# Patient Record
Sex: FEMALE | Race: WHITE | NOT HISPANIC OR LATINO | Employment: OTHER | URBAN - METROPOLITAN AREA
[De-identification: names, ages, dates, MRNs, and addresses within clinical notes are randomized per-mention and may not be internally consistent; named-entity substitution may affect disease eponyms.]

---

## 2018-02-21 ENCOUNTER — CONVERSION ENCOUNTER (OUTPATIENT)
Dept: FAMILY MEDICINE CLINIC | Facility: CLINIC | Age: 73
End: 2018-02-21

## 2018-02-21 LAB
ALBUMIN SERPL-MCNC: 4 G/DL (ref 3.6–5.1)
ALBUMIN/GLOB SERPL: ABNORMAL {RATIO} (ref 1–2.1)
ALP SERPL-CCNC: 69 UNITS/L (ref 33–130)
ALT SERPL-CCNC: 28 UNITS/L (ref 6–40)
AST SERPL-CCNC: 23 UNITS/L (ref 10–35)
BASOPHILS # BLD AUTO: ABNORMAL 10*3/MM3 (ref 0–200)
BASOPHILS NFR BLD AUTO: 1 %
BILIRUB SERPL-MCNC: 0.9 MG/DL (ref 0.2–1.2)
BUN SERPL-MCNC: 24 MG/DL (ref 7–25)
BUN/CREAT SERPL: ABNORMAL (ref 6–22)
CALCIUM SERPL-MCNC: 9.9 MG/DL (ref 8.6–10.2)
CHLORIDE SERPL-SCNC: 106 MMOL/L (ref 98–110)
CHOLEST SERPL-MCNC: 155 MG/DL (ref 125–200)
CHOLEST/HDLC SERPL: ABNORMAL {RATIO}
CONV CO2: 27 MMOL/L (ref 21–33)
CONV NEUTROPHILS/100 LEUKOCYTES IN BODY FLUID BY MANUAL COUNT: 66 %
CONV TOTAL PROTEIN: 6.3 G/DL (ref 6.2–8.3)
CREAT UR-MCNC: 0.8 MG/DL (ref 0.63–1.22)
EOSINOPHIL # BLD AUTO: 5 %
EOSINOPHIL # BLD AUTO: ABNORMAL 10*3/MM3 (ref 15–500)
ERYTHROCYTE [DISTWIDTH] IN BLOOD BY AUTOMATED COUNT: 13.2 % (ref 11–15)
GLOBULIN UR ELPH-MCNC: ABNORMAL G/DL (ref 2.2–3.9)
GLUCOSE SERPL-MCNC: 88 MG/DL (ref 65–99)
HCT VFR BLD AUTO: 40.9 % (ref 35–45)
HDLC SERPL-MCNC: 38 MG/DL
HGB BLD-MCNC: 13.5 G/DL (ref 11.7–15.5)
LDLC SERPL CALC-MCNC: 73 MG/DL
LYMPHOCYTES # BLD AUTO: ABNORMAL 10*3/MM3 (ref 850–3900)
LYMPHOCYTES NFR BLD AUTO: 24 %
MCH RBC QN AUTO: 29.3 PG (ref 27–33)
MCHC RBC AUTO-ENTMCNC: ABNORMAL % (ref 32–36)
MCV RBC AUTO: 88.6 FL (ref 80–100)
MONOCYTES # BLD AUTO: ABNORMAL 10*3/MICROLITER (ref 200–950)
MONOCYTES NFR BLD AUTO: 5 %
NEUTROPHILS # BLD AUTO: ABNORMAL 10*3/MM3 (ref 1500–7800)
PLATELET # BLD AUTO: ABNORMAL 10*3/MM3 (ref 140–400)
PMV BLD AUTO: 9.1 FL (ref 7.5–11.5)
POTASSIUM SERPL-SCNC: 4.5 MMOL/L (ref 3.5–5.3)
RBC # BLD AUTO: ABNORMAL 10*6/MM3 (ref 3.8–5.1)
SODIUM SERPL-SCNC: 142 MMOL/L (ref 135–146)
TRIGL SERPL-MCNC: 219 MG/DL
WBC # BLD AUTO: ABNORMAL 10*3/MM3 (ref 3.8–10.8)

## 2018-08-28 ENCOUNTER — HOSPITAL ENCOUNTER (OUTPATIENT)
Dept: FAMILY MEDICINE CLINIC | Facility: CLINIC | Age: 73
Discharge: HOME | End: 2018-08-28
Attending: NURSE PRACTITIONER | Admitting: NURSE PRACTITIONER

## 2018-08-28 LAB
ALBUMIN SERPL-MCNC: 4.4 G/DL (ref 3.6–5.1)
ALBUMIN/GLOB SERPL: NORMAL {RATIO} (ref 1–2.5)
ALP SERPL-CCNC: 89 UNITS/L (ref 33–130)
ALT SERPL-CCNC: 15 UNITS/L (ref 6–29)
AST SERPL-CCNC: 13 UNITS/L (ref 10–35)
BASOPHILS # BLD AUTO: NORMAL 10*3/MM3 (ref 0–200)
BASOPHILS NFR BLD AUTO: 0.9 %
BILIRUB SERPL-MCNC: 0.4 MG/DL (ref 0.2–1.2)
BUN SERPL-MCNC: 20 MG/DL (ref 7–25)
BUN/CREAT SERPL: NORMAL (ref 6–22)
CALCIUM SERPL-MCNC: 10 MG/DL (ref 8.6–10.4)
CHLORIDE SERPL-SCNC: 104 MMOL/L (ref 98–110)
CO2 CONTENT VENOUS: 28 MMOL/L (ref 20–32)
CONV NEUTROPHILS/100 LEUKOCYTES IN BODY FLUID BY MANUAL COUNT: 66.6 %
CONV TOTAL PROTEIN: 6.8 G/DL (ref 6.1–8.1)
CREAT UR-MCNC: 0.71 MG/DL (ref 0.6–0.93)
EOSINOPHIL # BLD AUTO: 3.2 %
EOSINOPHIL # BLD AUTO: NORMAL 10*3/MM3 (ref 15–500)
ERYTHROCYTE [DISTWIDTH] IN BLOOD BY AUTOMATED COUNT: 13 % (ref 11–15)
GLOBULIN UR ELPH-MCNC: NORMAL G/DL (ref 1.9–3.7)
GLUCOSE SERPL-MCNC: 79 MG/DL (ref 65–139)
HCT VFR BLD AUTO: 40 % (ref 35–45)
HGB BLD-MCNC: 13.5 G/DL (ref 11.7–15.5)
LYMPHOCYTES # BLD AUTO: NORMAL 10*3/MM3 (ref 850–3900)
LYMPHOCYTES NFR BLD AUTO: 22.7 %
MCH RBC QN AUTO: 29.7 PG (ref 27–33)
MCHC RBC AUTO-ENTMCNC: NORMAL % (ref 32–36)
MCV RBC AUTO: 88.1 FL (ref 80–100)
MONOCYTES # BLD AUTO: NORMAL 10*3/MICROLITER (ref 200–950)
MONOCYTES NFR BLD AUTO: 6.6 %
NEUTROPHILS # BLD AUTO: NORMAL 10*3/MM3 (ref 1500–7800)
PLATELET # BLD AUTO: NORMAL 10*3/MM3 (ref 140–400)
PMV BLD AUTO: 10.9 FL (ref 7.5–12.5)
POTASSIUM SERPL-SCNC: 4 MMOL/L (ref 3.5–5.3)
RBC # BLD AUTO: NORMAL 10*6/MM3 (ref 3.8–5.1)
SODIUM SERPL-SCNC: 140 MMOL/L (ref 135–146)
WBC # BLD AUTO: NORMAL K/UL (ref 3.8–10.8)

## 2019-10-10 ENCOUNTER — OFFICE VISIT (OUTPATIENT)
Dept: FAMILY MEDICINE CLINIC | Facility: CLINIC | Age: 74
End: 2019-10-10

## 2019-10-10 VITALS
WEIGHT: 166 LBS | SYSTOLIC BLOOD PRESSURE: 168 MMHG | BODY MASS INDEX: 27.66 KG/M2 | DIASTOLIC BLOOD PRESSURE: 78 MMHG | TEMPERATURE: 97.8 F | HEART RATE: 54 BPM | OXYGEN SATURATION: 99 % | HEIGHT: 65 IN

## 2019-10-10 DIAGNOSIS — I10 ESSENTIAL HYPERTENSION: ICD-10-CM

## 2019-10-10 DIAGNOSIS — Z78.0 POSTMENOPAUSAL: ICD-10-CM

## 2019-10-10 DIAGNOSIS — Z12.31 ENCOUNTER FOR SCREENING MAMMOGRAM FOR BREAST CANCER: ICD-10-CM

## 2019-10-10 DIAGNOSIS — M19.90 ARTHRITIS: ICD-10-CM

## 2019-10-10 DIAGNOSIS — Z00.00 PREVENTATIVE HEALTH CARE: ICD-10-CM

## 2019-10-10 DIAGNOSIS — Z23 NEED FOR VACCINATION WITH 13-POLYVALENT PNEUMOCOCCAL CONJUGATE VACCINE: ICD-10-CM

## 2019-10-10 DIAGNOSIS — Z23 FLU VACCINE NEED: Primary | ICD-10-CM

## 2019-10-10 DIAGNOSIS — G89.29 OTHER CHRONIC PAIN: ICD-10-CM

## 2019-10-10 PROCEDURE — 99214 OFFICE O/P EST MOD 30 MIN: CPT | Performed by: NURSE PRACTITIONER

## 2019-10-10 PROCEDURE — G0009 ADMIN PNEUMOCOCCAL VACCINE: HCPCS | Performed by: NURSE PRACTITIONER

## 2019-10-10 PROCEDURE — G0008 ADMIN INFLUENZA VIRUS VAC: HCPCS | Performed by: NURSE PRACTITIONER

## 2019-10-10 PROCEDURE — 90670 PCV13 VACCINE IM: CPT | Performed by: NURSE PRACTITIONER

## 2019-10-10 PROCEDURE — 90653 IIV ADJUVANT VACCINE IM: CPT | Performed by: NURSE PRACTITIONER

## 2019-10-10 RX ORDER — MELOXICAM 15 MG/1
15 TABLET ORAL DAILY
Qty: 90 TABLET | Refills: 1 | Status: SHIPPED | OUTPATIENT
Start: 2019-10-10 | End: 2020-07-18

## 2019-10-10 RX ORDER — LISINOPRIL 10 MG/1
10 TABLET ORAL DAILY
Qty: 30 TABLET | Refills: 2 | Status: SHIPPED | OUTPATIENT
Start: 2019-10-10 | End: 2019-10-10 | Stop reason: SDUPTHER

## 2019-10-10 RX ORDER — DULOXETIN HYDROCHLORIDE 60 MG/1
60 CAPSULE, DELAYED RELEASE ORAL DAILY
COMMUNITY
End: 2019-10-10 | Stop reason: SDUPTHER

## 2019-10-10 RX ORDER — BISOPROLOL FUMARATE AND HYDROCHLOROTHIAZIDE 10; 6.25 MG/1; MG/1
1 TABLET ORAL DAILY
Qty: 30 TABLET | Refills: 2 | Status: SHIPPED | OUTPATIENT
Start: 2019-10-10 | End: 2019-10-10 | Stop reason: SDUPTHER

## 2019-10-10 RX ORDER — LISINOPRIL 10 MG/1
10 TABLET ORAL DAILY
Qty: 90 TABLET | Refills: 1 | Status: SHIPPED | OUTPATIENT
Start: 2019-10-10 | End: 2020-07-18

## 2019-10-10 RX ORDER — MELOXICAM 15 MG/1
15 TABLET ORAL DAILY
COMMUNITY
End: 2019-10-10 | Stop reason: SDUPTHER

## 2019-10-10 RX ORDER — MELOXICAM 15 MG/1
15 TABLET ORAL DAILY
Qty: 30 TABLET | Refills: 2 | Status: SHIPPED | OUTPATIENT
Start: 2019-10-10 | End: 2019-10-10 | Stop reason: SDUPTHER

## 2019-10-10 RX ORDER — DULOXETIN HYDROCHLORIDE 60 MG/1
60 CAPSULE, DELAYED RELEASE ORAL DAILY
Qty: 30 CAPSULE | Refills: 2 | Status: SHIPPED | OUTPATIENT
Start: 2019-10-10 | End: 2019-10-10 | Stop reason: SDUPTHER

## 2019-10-10 RX ORDER — ATORVASTATIN CALCIUM 40 MG/1
40 TABLET, FILM COATED ORAL
COMMUNITY
End: 2019-10-10 | Stop reason: SDUPTHER

## 2019-10-10 RX ORDER — BISOPROLOL FUMARATE AND HYDROCHLOROTHIAZIDE 10; 6.25 MG/1; MG/1
1 TABLET ORAL DAILY
Qty: 90 TABLET | Refills: 1 | Status: SHIPPED | OUTPATIENT
Start: 2019-10-10 | End: 2020-07-18

## 2019-10-10 RX ORDER — BISOPROLOL FUMARATE AND HYDROCHLOROTHIAZIDE 10; 6.25 MG/1; MG/1
1 TABLET ORAL DAILY
COMMUNITY
End: 2019-10-10 | Stop reason: SDUPTHER

## 2019-10-10 RX ORDER — DULOXETIN HYDROCHLORIDE 60 MG/1
60 CAPSULE, DELAYED RELEASE ORAL DAILY
Qty: 90 CAPSULE | Refills: 1 | Status: SHIPPED | OUTPATIENT
Start: 2019-10-10 | End: 2020-06-15

## 2019-10-10 RX ORDER — LISINOPRIL 10 MG/1
10 TABLET ORAL DAILY
COMMUNITY
End: 2019-10-10 | Stop reason: SDUPTHER

## 2019-10-10 RX ORDER — ATORVASTATIN CALCIUM 40 MG/1
40 TABLET, FILM COATED ORAL
Qty: 90 TABLET | Refills: 1 | Status: SHIPPED | OUTPATIENT
Start: 2019-10-10 | End: 2020-04-07

## 2019-10-10 NOTE — PATIENT INSTRUCTIONS
Fasting blood work today   Keep appointment for mammogram and DEXA scan   monitor blood pressure at home   consider CBD tablets for arthritis

## 2019-10-10 NOTE — PROGRESS NOTES
Subjective   Shelley Black is a 74 y.o. female.     74-year-old white female with history of hypertension, chronic arthritis pain, psoriasis and allergic rhinitis comes in today for annual visit and fasting blood work.  She denies any problems with exception of arthritis in her hands and ventricles are enlarged due to arthritis panel today since she has never had one  Blood pressure elevated today 160/78 heart rate 54 but at home she checks it regularly and it is in the 120s 130s over 70s and 80s.  She denies any chest pain, dyspnea, tachycardia cardia, dizziness or edema  Weight is unchanged at 166  She is up-to-date on eye exams colonoscopies and I am getting her mammogram and DEXA scan.  She had a flu shot Prevnar 13 today     mammogram/ DEXA scan   fasting blood work today   flu shot/ Prevnar 13         The following portions of the patient's history were reviewed and updated as appropriate: allergies, current medications, past family history, past medical history, past social history, past surgical history and problem list.    Review of Systems   Constitutional: Negative.    Respiratory: Negative.    Cardiovascular: Negative.    Genitourinary: Negative.    Musculoskeletal: Negative.         Arthritis and hands   Skin: Negative.    Neurological: Negative.    Psychiatric/Behavioral: Negative.        Objective   Physical Exam   Constitutional: She is oriented to person, place, and time. She appears well-developed and well-nourished.   Cardiovascular: Normal rate and regular rhythm.   Pulmonary/Chest: Effort normal and breath sounds normal.   Abdominal: Soft. Bowel sounds are normal.   Musculoskeletal:   Chronic arthritis pain in hands with enlarged knuckles   Neurological: She is alert and oriented to person, place, and time.   Skin: Skin is warm and dry.   Psychiatric: She has a normal mood and affect.         Assessment/Plan   Shelley was seen today for hypertension and depression.    Diagnoses and all orders  for this visit:    Flu vaccine need  -     Fluad Quad >65 years    Essential hypertension    Other chronic pain    Arthritis    Preventative health care  -     CBC & Differential  -     Comprehensive Metabolic Panel  -     Lipid Panel With LDL / HDL Ratio  -     PEPE  -     C-reactive Protein  -     Sedimentation Rate  -     Rheumatoid Arthritis Expanded Panel    Other orders  -     bisoprolol-hydrochlorothiazide (ZIAC) 10-6.25 MG per tablet; Take 1 tablet by mouth Daily.  -     DULoxetine (CYMBALTA) 60 MG capsule; Take 1 capsule by mouth Daily.  -     lisinopril (PRINIVIL,ZESTRIL) 10 MG tablet; Take 1 tablet by mouth Daily.  -     meloxicam (MOBIC) 15 MG tablet; Take 1 tablet by mouth Daily.

## 2019-10-11 LAB
ALBUMIN SERPL-MCNC: 4.7 G/DL (ref 3.5–4.8)
ALBUMIN/GLOB SERPL: 2.1 {RATIO} (ref 1.2–2.2)
ALP SERPL-CCNC: 96 IU/L (ref 39–117)
ALT SERPL-CCNC: 15 IU/L (ref 0–32)
AST SERPL-CCNC: 15 IU/L (ref 0–40)
BILIRUB SERPL-MCNC: 0.5 MG/DL (ref 0–1.2)
BUN SERPL-MCNC: 26 MG/DL (ref 8–27)
BUN/CREAT SERPL: 35 (ref 12–28)
CALCIUM SERPL-MCNC: 9.8 MG/DL (ref 8.7–10.3)
CHLORIDE SERPL-SCNC: 104 MMOL/L (ref 96–106)
CHOLEST SERPL-MCNC: 213 MG/DL (ref 100–199)
CO2 SERPL-SCNC: 25 MMOL/L (ref 20–29)
CREAT SERPL-MCNC: 0.75 MG/DL (ref 0.57–1)
GLOBULIN SER CALC-MCNC: 2.2 G/DL (ref 1.5–4.5)
GLUCOSE SERPL-MCNC: 95 MG/DL (ref 65–99)
HDLC SERPL-MCNC: 38 MG/DL
LDLC SERPL CALC-MCNC: ABNORMAL MG/DL (ref 0–99)
LDLC/HDLC SERPL: ABNORMAL RATIO
POTASSIUM SERPL-SCNC: 4.7 MMOL/L (ref 3.5–5.2)
PROT SERPL-MCNC: 6.9 G/DL (ref 6–8.5)
SODIUM SERPL-SCNC: 145 MMOL/L (ref 134–144)
TRIGL SERPL-MCNC: 458 MG/DL (ref 0–149)
VLDLC SERPL CALC-MCNC: ABNORMAL MG/DL (ref 5–40)

## 2019-10-12 RX ORDER — FENOFIBRATE 145 MG/1
145 TABLET, COATED ORAL DAILY
Qty: 30 TABLET | Refills: 2 | Status: SHIPPED | OUTPATIENT
Start: 2019-10-12 | End: 2019-10-14 | Stop reason: SDUPTHER

## 2019-10-14 DIAGNOSIS — M19.90 ARTHRITIS: Primary | ICD-10-CM

## 2019-10-14 DIAGNOSIS — E78.1 HYPERTRIGLYCERIDEMIA: ICD-10-CM

## 2019-10-14 DIAGNOSIS — M25.50 PAIN IN JOINT, MULTIPLE SITES: ICD-10-CM

## 2019-10-14 DIAGNOSIS — I10 ESSENTIAL HYPERTENSION: ICD-10-CM

## 2019-10-14 RX ORDER — FENOFIBRATE 145 MG/1
145 TABLET, COATED ORAL DAILY
Qty: 90 TABLET | Refills: 1 | Status: SHIPPED | OUTPATIENT
Start: 2019-10-14 | End: 2020-06-15

## 2019-11-14 PROBLEM — E78.2 MIXED HYPERLIPIDEMIA: Status: ACTIVE | Noted: 2019-11-14

## 2019-11-14 PROBLEM — L40.9 PSORIASIS: Status: ACTIVE | Noted: 2017-06-08

## 2019-11-14 PROBLEM — F32.A DEPRESSION: Status: ACTIVE | Noted: 2017-06-08

## 2019-11-14 PROBLEM — M19.90 ARTHRITIS: Status: ACTIVE | Noted: 2017-06-08

## 2019-11-14 PROBLEM — IMO0002 BODY MASS INDEX (BMI) OF 25.0 TO 29.9: Status: ACTIVE | Noted: 2017-06-08

## 2019-11-14 PROBLEM — E78.1 HIGH TRIGLYCERIDES: Status: ACTIVE | Noted: 2019-11-14

## 2019-11-14 PROBLEM — Z01.818 PREOPERATIVE EVALUATION TO RULE OUT SURGICAL CONTRAINDICATION: Status: ACTIVE | Noted: 2018-08-28

## 2019-11-14 PROBLEM — M21.612 BUNION, LEFT: Status: ACTIVE | Noted: 2018-08-28

## 2019-11-14 PROBLEM — J30.1 ALLERGIC RHINITIS DUE TO POLLEN: Status: ACTIVE | Noted: 2017-06-08

## 2019-11-14 PROBLEM — Z13.220 ENCOUNTER FOR SCREENING FOR LIPOID DISORDERS: Status: ACTIVE | Noted: 2018-02-21

## 2020-01-14 ENCOUNTER — RESULTS ENCOUNTER (OUTPATIENT)
Dept: FAMILY MEDICINE CLINIC | Facility: CLINIC | Age: 75
End: 2020-01-14

## 2020-01-14 DIAGNOSIS — E78.1 HYPERTRIGLYCERIDEMIA: ICD-10-CM

## 2020-01-14 DIAGNOSIS — I10 ESSENTIAL HYPERTENSION: ICD-10-CM

## 2020-01-14 DIAGNOSIS — M25.50 PAIN IN JOINT, MULTIPLE SITES: ICD-10-CM

## 2020-01-27 ENCOUNTER — PATIENT OUTREACH (OUTPATIENT)
Dept: CASE MANAGEMENT | Facility: OTHER | Age: 75
End: 2020-01-27

## 2020-01-27 NOTE — OUTREACH NOTE
LM informing pt that she was due for her AWV and offered to flip her upcoming appointment to an AWV or schedule a separate appointment. Pt was advised to call back.     Cipriano De La Garza, [facility]  [facility] and    Phone: [phone]

## 2020-04-07 RX ORDER — ATORVASTATIN CALCIUM 40 MG/1
TABLET, FILM COATED ORAL
Qty: 90 TABLET | Refills: 3 | Status: SHIPPED | OUTPATIENT
Start: 2020-04-07 | End: 2021-04-07 | Stop reason: SDUPTHER

## 2020-04-09 ENCOUNTER — OFFICE VISIT (OUTPATIENT)
Dept: FAMILY MEDICINE CLINIC | Facility: CLINIC | Age: 75
End: 2020-04-09

## 2020-04-09 VITALS — HEART RATE: 57 BPM | DIASTOLIC BLOOD PRESSURE: 80 MMHG | SYSTOLIC BLOOD PRESSURE: 132 MMHG

## 2020-04-09 DIAGNOSIS — E78.2 MIXED HYPERLIPIDEMIA: Primary | ICD-10-CM

## 2020-04-09 DIAGNOSIS — Z00.00 PREVENTATIVE HEALTH CARE: ICD-10-CM

## 2020-04-09 DIAGNOSIS — Z78.0 POSTMENOPAUSAL: ICD-10-CM

## 2020-04-09 DIAGNOSIS — Z12.31 OTHER SCREENING MAMMOGRAM: ICD-10-CM

## 2020-04-09 PROCEDURE — 99441 PR PHYS/QHP TELEPHONE EVALUATION 5-10 MIN: CPT | Performed by: NURSE PRACTITIONER

## 2020-04-09 NOTE — PROGRESS NOTES
Shelley Black is a 75 y.o. female.     You have chosen to receive care through a telephone visit today. Do you consent to use a telephone visit for your medical care today? Yes    74-year-old white female with history of hypertension, chronic arthritis pain, psoriasis and allergic rhinitis who calls in today for follow-up telephone visit.  Patient states she is doing well has no new complaints.  Blood pressure at home this morning 132/80 heart rate 76 she denies any chest pain, dyspnea, tachycardia or dizziness  She was unable to do her mammogram DEXA scan due to the corona quarantine we will try to schedule for July.  She is up-to-date on her eye exams and colonoscopies  Her last triglycerides were 458 and she is going to come in for fasting blood work to recheck that    Phone time 10 minutes    Fasting blood work  Mammogram/DEXA scan in July       The following portions of the patient's history were reviewed and updated as appropriate: allergies, current medications, past family history, past medical history, past social history, past surgical history and problem list.    Vitals:    20 0908   BP: 132/80   Pulse: 57     There is no height or weight on file to calculate BMI.    Past Medical History:   Diagnosis Date   • Depression    • Hyperlipidemia    • Hypertension      Past Surgical History:   Procedure Laterality Date   •  SECTION       Family History   Problem Relation Age of Onset   • Cancer Mother    • Cancer Father    • Heart disease Father      Immunization History   Administered Date(s) Administered   • Fluad Quad 10/10/2019   • Pneumococcal Conjugate 13-Valent (PCV13) 10/10/2019       Office Visit on 10/10/2019   Component Date Value Ref Range Status   • Glucose 10/10/2019 95  65 - 99 mg/dL Final   • BUN 10/10/2019 26  8 - 27 mg/dL Final   • Creatinine 10/10/2019 0.75  0.57 - 1.00 mg/dL Final   • eGFR Non African Am 10/10/2019 80  >59 mL/min/1.73 Final   • eGFR African Am 10/10/2019  92  >59 mL/min/1.73 Final   • BUN/Creatinine Ratio 10/10/2019 35* 12 - 28 Final   • Sodium 10/10/2019 145* 134 - 144 mmol/L Final   • Potassium 10/10/2019 4.7  3.5 - 5.2 mmol/L Final   • Chloride 10/10/2019 104  96 - 106 mmol/L Final   • Total CO2 10/10/2019 25  20 - 29 mmol/L Final   • Calcium 10/10/2019 9.8  8.7 - 10.3 mg/dL Final   • Total Protein 10/10/2019 6.9  6.0 - 8.5 g/dL Final   • Albumin 10/10/2019 4.7  3.5 - 4.8 g/dL Final   • Globulin 10/10/2019 2.2  1.5 - 4.5 g/dL Final   • A/G Ratio 10/10/2019 2.1  1.2 - 2.2 Final   • Total Bilirubin 10/10/2019 0.5  0.0 - 1.2 mg/dL Final   • Alkaline Phosphatase 10/10/2019 96  39 - 117 IU/L Final   • AST (SGOT) 10/10/2019 15  0 - 40 IU/L Final   • ALT (SGPT) 10/10/2019 15  0 - 32 IU/L Final   • Total Cholesterol 10/10/2019 213* 100 - 199 mg/dL Final   • Triglycerides 10/10/2019 458* 0 - 149 mg/dL Final   • HDL Cholesterol 10/10/2019 38* >39 mg/dL Final   • VLDL Cholesterol 10/10/2019 Comment  5 - 40 mg/dL Final    Comment: The calculation for the VLDL cholesterol is not valid when  triglyceride level is >400 mg/dL.     • LDL Cholesterol  10/10/2019 Comment  0 - 99 mg/dL Final    Comment: Triglyceride result indicated is too high for an accurate LDL  cholesterol estimation.     • LDL/HDL Ratio 10/10/2019 CANCELED  ratio Final-Edited    Comment: Unable to calculate result since non-numeric result obtained for  component test.                                      LDL/HDL Ratio                                              Men  Women                                1/2 Avg.Risk  1.0    1.5                                    Avg.Risk  3.6    3.2                                 2X Avg.Risk  6.2    5.0                                 3X Avg.Risk  8.0    6.1    Result canceled by the ancillary.           Review of Systems   Constitutional: Negative.    HENT: Negative.    Respiratory: Negative.    Gastrointestinal: Negative.    Genitourinary: Negative.    Musculoskeletal:  Negative.    Skin: Negative.    Neurological: Negative.    Psychiatric/Behavioral: Negative.        Objective   Physical Exam   Constitutional: She appears well-developed and well-nourished.   Cardiovascular: Normal rate and regular rhythm.   Per patient   Pulmonary/Chest: Effort normal and breath sounds normal.   Per patient   Abdominal: Bowel sounds are normal.   Musculoskeletal: Normal range of motion.   Per patient   Skin: Skin is warm and dry.   Psychiatric: She has a normal mood and affect.       Procedures    Assessment/Plan   Shelley was seen today for hypertension and hyperlipidemia.    Diagnoses and all orders for this visit:    Mixed hyperlipidemia  -     Lipid Panel With LDL / HDL Ratio    Preventative health care  -     CBC & Differential  -     Comprehensive Metabolic Panel          Current Outpatient Medications:   •  atorvastatin (LIPITOR) 40 MG tablet, TAKE 1 TABLET EVERY NIGHT AT BEDTIME, Disp: 90 tablet, Rfl: 3  •  bisoprolol-hydrochlorothiazide (ZIAC) 10-6.25 MG per tablet, Take 1 tablet by mouth Daily., Disp: 90 tablet, Rfl: 1  •  DULoxetine (CYMBALTA) 60 MG capsule, Take 1 capsule by mouth Daily., Disp: 90 capsule, Rfl: 1  •  fenofibrate (TRICOR) 145 MG tablet, Take 1 tablet by mouth Daily., Disp: 90 tablet, Rfl: 1  •  lisinopril (PRINIVIL,ZESTRIL) 10 MG tablet, Take 1 tablet by mouth Daily., Disp: 90 tablet, Rfl: 1  •  meloxicam (MOBIC) 15 MG tablet, Take 1 tablet by mouth Daily., Disp: 90 tablet, Rfl: 1

## 2020-06-11 LAB
ALBUMIN SERPL-MCNC: 4.9 G/DL (ref 3.7–4.7)
ALBUMIN/GLOB SERPL: 2.2 {RATIO} (ref 1.2–2.2)
ALP SERPL-CCNC: 75 IU/L (ref 39–117)
ALT SERPL-CCNC: 18 IU/L (ref 0–32)
AST SERPL-CCNC: 16 IU/L (ref 0–40)
BASOPHILS # BLD AUTO: 0.1 X10E3/UL (ref 0–0.2)
BASOPHILS NFR BLD AUTO: 1 %
BILIRUB SERPL-MCNC: 0.6 MG/DL (ref 0–1.2)
BUN SERPL-MCNC: 26 MG/DL (ref 8–27)
BUN/CREAT SERPL: 35 (ref 12–28)
CALCIUM SERPL-MCNC: 10.2 MG/DL (ref 8.7–10.3)
CHLORIDE SERPL-SCNC: 105 MMOL/L (ref 96–106)
CHOLEST SERPL-MCNC: 159 MG/DL (ref 100–199)
CO2 SERPL-SCNC: 25 MMOL/L (ref 20–29)
CREAT SERPL-MCNC: 0.74 MG/DL (ref 0.57–1)
EOSINOPHIL # BLD AUTO: 0.4 X10E3/UL (ref 0–0.4)
EOSINOPHIL NFR BLD AUTO: 5 %
ERYTHROCYTE [DISTWIDTH] IN BLOOD BY AUTOMATED COUNT: 13 % (ref 11.7–15.4)
GLOBULIN SER CALC-MCNC: 2.2 G/DL (ref 1.5–4.5)
GLUCOSE SERPL-MCNC: 93 MG/DL (ref 65–99)
HCT VFR BLD AUTO: 46.5 % (ref 34–46.6)
HDLC SERPL-MCNC: 38 MG/DL
HGB BLD-MCNC: 14.8 G/DL (ref 11.1–15.9)
IMM GRANULOCYTES # BLD AUTO: 0 X10E3/UL (ref 0–0.1)
IMM GRANULOCYTES NFR BLD AUTO: 0 %
LDLC SERPL CALC-MCNC: 72 MG/DL (ref 0–99)
LDLC/HDLC SERPL: 1.9 RATIO (ref 0–3.2)
LYMPHOCYTES # BLD AUTO: 1.8 X10E3/UL (ref 0.7–3.1)
LYMPHOCYTES NFR BLD AUTO: 24 %
MCH RBC QN AUTO: 29 PG (ref 26.6–33)
MCHC RBC AUTO-ENTMCNC: 31.8 G/DL (ref 31.5–35.7)
MCV RBC AUTO: 91 FL (ref 79–97)
MONOCYTES # BLD AUTO: 0.5 X10E3/UL (ref 0.1–0.9)
MONOCYTES NFR BLD AUTO: 7 %
NEUTROPHILS # BLD AUTO: 4.9 X10E3/UL (ref 1.4–7)
NEUTROPHILS NFR BLD AUTO: 63 %
PLATELET # BLD AUTO: 263 X10E3/UL (ref 150–450)
POTASSIUM SERPL-SCNC: 4.3 MMOL/L (ref 3.5–5.2)
PROT SERPL-MCNC: 7.1 G/DL (ref 6–8.5)
RBC # BLD AUTO: 5.1 X10E6/UL (ref 3.77–5.28)
SODIUM SERPL-SCNC: 146 MMOL/L (ref 134–144)
TRIGL SERPL-MCNC: 244 MG/DL (ref 0–149)
VLDLC SERPL CALC-MCNC: 49 MG/DL (ref 5–40)
WBC # BLD AUTO: 7.7 X10E3/UL (ref 3.4–10.8)

## 2020-06-15 RX ORDER — DULOXETIN HYDROCHLORIDE 60 MG/1
CAPSULE, DELAYED RELEASE ORAL
Qty: 90 CAPSULE | Refills: 3 | Status: SHIPPED | OUTPATIENT
Start: 2020-06-15 | End: 2021-04-07 | Stop reason: SDUPTHER

## 2020-06-15 RX ORDER — FENOFIBRATE 145 MG/1
TABLET, COATED ORAL
Qty: 90 TABLET | Refills: 3 | Status: SHIPPED | OUTPATIENT
Start: 2020-06-15 | End: 2021-04-07 | Stop reason: SDUPTHER

## 2020-07-18 RX ORDER — MELOXICAM 15 MG/1
TABLET ORAL
Qty: 30 TABLET | Refills: 11 | Status: SHIPPED | OUTPATIENT
Start: 2020-07-18 | End: 2021-04-07 | Stop reason: SDUPTHER

## 2020-07-18 RX ORDER — LISINOPRIL 10 MG/1
TABLET ORAL
Qty: 30 TABLET | Refills: 11 | Status: SHIPPED | OUTPATIENT
Start: 2020-07-18 | End: 2021-04-07 | Stop reason: SDUPTHER

## 2020-07-18 RX ORDER — BISOPROLOL FUMARATE AND HYDROCHLOROTHIAZIDE 10; 6.25 MG/1; MG/1
TABLET ORAL
Qty: 30 TABLET | Refills: 11 | Status: SHIPPED | OUTPATIENT
Start: 2020-07-18 | End: 2021-04-07 | Stop reason: SDUPTHER

## 2020-08-31 DIAGNOSIS — R92.8 ABNORMAL MAMMOGRAM: Primary | ICD-10-CM

## 2020-08-31 DIAGNOSIS — Z78.0 POSTMENOPAUSAL: ICD-10-CM

## 2020-08-31 DIAGNOSIS — Z12.31 OTHER SCREENING MAMMOGRAM: ICD-10-CM

## 2020-09-21 ENCOUNTER — TELEPHONE (OUTPATIENT)
Dept: FAMILY MEDICINE CLINIC | Facility: CLINIC | Age: 75
End: 2020-09-21

## 2020-09-21 DIAGNOSIS — R92.8 ABNORMAL MAMMOGRAM: Primary | ICD-10-CM

## 2020-10-01 PROCEDURE — U0003 INFECTIOUS AGENT DETECTION BY NUCLEIC ACID (DNA OR RNA); SEVERE ACUTE RESPIRATORY SYNDROME CORONAVIRUS 2 (SARS-COV-2) (CORONAVIRUS DISEASE [COVID-19]), AMPLIFIED PROBE TECHNIQUE, MAKING USE OF HIGH THROUGHPUT TECHNOLOGIES AS DESCRIBED BY CMS-2020-01-R: HCPCS

## 2020-10-02 ENCOUNTER — TELEPHONE (OUTPATIENT)
Dept: URGENT CARE | Facility: CLINIC | Age: 75
End: 2020-10-02

## 2020-10-02 NOTE — TELEPHONE ENCOUNTER
I called this patient her coronavirus test.  Told her that it was positive.  Told her she would need to continue home quarantine for 10 days.  Told her she would need to stay 6 feet away from others in her home and to wear a mask if she is in the same room with others even if she is 10 feet away.

## 2020-10-27 DIAGNOSIS — R92.8 ABNORMAL MAMMOGRAM: ICD-10-CM

## 2021-04-07 ENCOUNTER — OFFICE VISIT (OUTPATIENT)
Dept: FAMILY MEDICINE CLINIC | Facility: CLINIC | Age: 76
End: 2021-04-07

## 2021-04-07 VITALS
HEIGHT: 65 IN | DIASTOLIC BLOOD PRESSURE: 84 MMHG | HEART RATE: 60 BPM | SYSTOLIC BLOOD PRESSURE: 146 MMHG | TEMPERATURE: 97.3 F | BODY MASS INDEX: 28.39 KG/M2 | WEIGHT: 170.4 LBS | OXYGEN SATURATION: 96 %

## 2021-04-07 DIAGNOSIS — Z00.00 PREVENTATIVE HEALTH CARE: ICD-10-CM

## 2021-04-07 DIAGNOSIS — Z12.11 COLON CANCER SCREENING: Primary | ICD-10-CM

## 2021-04-07 DIAGNOSIS — E78.2 MIXED HYPERLIPIDEMIA: ICD-10-CM

## 2021-04-07 PROBLEM — IMO0002 BODY MASS INDEX (BMI) OF 25.0 TO 29.9: Status: RESOLVED | Noted: 2017-06-08 | Resolved: 2021-04-07

## 2021-04-07 PROCEDURE — 99213 OFFICE O/P EST LOW 20 MIN: CPT | Performed by: NURSE PRACTITIONER

## 2021-04-07 RX ORDER — DULOXETIN HYDROCHLORIDE 60 MG/1
60 CAPSULE, DELAYED RELEASE ORAL DAILY
Qty: 90 CAPSULE | Refills: 1 | Status: SHIPPED | OUTPATIENT
Start: 2021-04-07 | End: 2021-10-07 | Stop reason: SDUPTHER

## 2021-04-07 RX ORDER — ATORVASTATIN CALCIUM 40 MG/1
40 TABLET, FILM COATED ORAL
Qty: 90 TABLET | Refills: 1 | Status: SHIPPED | OUTPATIENT
Start: 2021-04-07 | End: 2021-09-29

## 2021-04-07 RX ORDER — MELOXICAM 15 MG/1
15 TABLET ORAL DAILY
Qty: 90 TABLET | Refills: 1 | Status: SHIPPED | OUTPATIENT
Start: 2021-04-07 | End: 2021-10-07 | Stop reason: SDUPTHER

## 2021-04-07 RX ORDER — LISINOPRIL 10 MG/1
10 TABLET ORAL DAILY
Qty: 90 TABLET | Refills: 1 | Status: SHIPPED | OUTPATIENT
Start: 2021-04-07 | End: 2021-10-07 | Stop reason: SDUPTHER

## 2021-04-07 RX ORDER — BISOPROLOL FUMARATE AND HYDROCHLOROTHIAZIDE 10; 6.25 MG/1; MG/1
1 TABLET ORAL DAILY
Qty: 90 TABLET | Refills: 1 | Status: SHIPPED | OUTPATIENT
Start: 2021-04-07 | End: 2021-10-07 | Stop reason: SDUPTHER

## 2021-04-07 RX ORDER — FENOFIBRATE 145 MG/1
145 TABLET, COATED ORAL DAILY
Qty: 90 TABLET | Refills: 1 | Status: SHIPPED | OUTPATIENT
Start: 2021-04-07 | End: 2021-10-07 | Stop reason: SDUPTHER

## 2021-04-08 LAB
ALBUMIN SERPL-MCNC: 4.7 G/DL (ref 3.7–4.7)
ALBUMIN/GLOB SERPL: 2 {RATIO} (ref 1.2–2.2)
ALP SERPL-CCNC: 73 IU/L (ref 39–117)
ALT SERPL-CCNC: 16 IU/L (ref 0–32)
AST SERPL-CCNC: 17 IU/L (ref 0–40)
BASOPHILS # BLD AUTO: 0.1 X10E3/UL (ref 0–0.2)
BASOPHILS NFR BLD AUTO: 1 %
BILIRUB SERPL-MCNC: 0.5 MG/DL (ref 0–1.2)
BUN SERPL-MCNC: 27 MG/DL (ref 8–27)
BUN/CREAT SERPL: 32 (ref 12–28)
CALCIUM SERPL-MCNC: 10 MG/DL (ref 8.7–10.3)
CHLORIDE SERPL-SCNC: 107 MMOL/L (ref 96–106)
CHOLEST SERPL-MCNC: 174 MG/DL (ref 100–199)
CO2 SERPL-SCNC: 21 MMOL/L (ref 20–29)
CREAT SERPL-MCNC: 0.84 MG/DL (ref 0.57–1)
EOSINOPHIL # BLD AUTO: 0.3 X10E3/UL (ref 0–0.4)
EOSINOPHIL NFR BLD AUTO: 4 %
ERYTHROCYTE [DISTWIDTH] IN BLOOD BY AUTOMATED COUNT: 13.9 % (ref 11.7–15.4)
GLOBULIN SER CALC-MCNC: 2.3 G/DL (ref 1.5–4.5)
GLUCOSE SERPL-MCNC: 97 MG/DL (ref 65–99)
HCT VFR BLD AUTO: 44.6 % (ref 34–46.6)
HDLC SERPL-MCNC: 42 MG/DL
HGB BLD-MCNC: 14.4 G/DL (ref 11.1–15.9)
IMM GRANULOCYTES # BLD AUTO: 0 X10E3/UL (ref 0–0.1)
IMM GRANULOCYTES NFR BLD AUTO: 1 %
LDLC SERPL CALC-MCNC: 75 MG/DL (ref 0–99)
LDLC/HDLC SERPL: 1.8 RATIO (ref 0–3.2)
LYMPHOCYTES # BLD AUTO: 1.6 X10E3/UL (ref 0.7–3.1)
LYMPHOCYTES NFR BLD AUTO: 21 %
MCH RBC QN AUTO: 29.1 PG (ref 26.6–33)
MCHC RBC AUTO-ENTMCNC: 32.3 G/DL (ref 31.5–35.7)
MCV RBC AUTO: 90 FL (ref 79–97)
MONOCYTES # BLD AUTO: 0.5 X10E3/UL (ref 0.1–0.9)
MONOCYTES NFR BLD AUTO: 6 %
NEUTROPHILS # BLD AUTO: 5.2 X10E3/UL (ref 1.4–7)
NEUTROPHILS NFR BLD AUTO: 67 %
PLATELET # BLD AUTO: 239 X10E3/UL (ref 150–450)
POTASSIUM SERPL-SCNC: 4.3 MMOL/L (ref 3.5–5.2)
PROT SERPL-MCNC: 7 G/DL (ref 6–8.5)
RBC # BLD AUTO: 4.94 X10E6/UL (ref 3.77–5.28)
SODIUM SERPL-SCNC: 141 MMOL/L (ref 134–144)
TRIGL SERPL-MCNC: 354 MG/DL (ref 0–149)
VLDLC SERPL CALC-MCNC: 57 MG/DL (ref 5–40)
WBC # BLD AUTO: 7.7 X10E3/UL (ref 3.4–10.8)

## 2021-04-13 ENCOUNTER — TELEPHONE (OUTPATIENT)
Dept: FAMILY MEDICINE CLINIC | Facility: CLINIC | Age: 76
End: 2021-04-13

## 2021-04-13 NOTE — TELEPHONE ENCOUNTER
Caller: Shelley Black    Relationship to patient: Self    Best call back number:[phone]    Patient is needing: PATIENT RECEIVED A CALL FROM HADLEY IN THE OFFICE AND WAS RETURNING HER CALL. PLEASE ADVISE.

## 2021-04-19 NOTE — TELEPHONE ENCOUNTER
Spoke with pt, advised of Marley's most recent message in regards to lab results. She voiced understanding.

## 2021-06-15 ENCOUNTER — TELEPHONE (OUTPATIENT)
Dept: FAMILY MEDICINE CLINIC | Facility: CLINIC | Age: 76
End: 2021-06-15

## 2021-06-15 DIAGNOSIS — R92.8 ABNORMAL MAMMOGRAM: Primary | ICD-10-CM

## 2021-06-15 NOTE — TELEPHONE ENCOUNTER
Patient called said that she got a letter from [facility] stating that she needs a short term follow up exam and that our office would need to fax something over to there office.

## 2021-06-23 DIAGNOSIS — R92.8 ABNORMAL MAMMOGRAM: ICD-10-CM

## 2021-09-29 RX ORDER — ATORVASTATIN CALCIUM 40 MG/1
TABLET, FILM COATED ORAL
Qty: 90 TABLET | Refills: 1 | Status: SHIPPED | OUTPATIENT
Start: 2021-09-29 | End: 2021-10-07 | Stop reason: SDUPTHER

## 2021-10-07 ENCOUNTER — OFFICE VISIT (OUTPATIENT)
Dept: FAMILY MEDICINE CLINIC | Facility: CLINIC | Age: 76
End: 2021-10-07

## 2021-10-07 VITALS
HEIGHT: 65 IN | WEIGHT: 168 LBS | BODY MASS INDEX: 27.99 KG/M2 | TEMPERATURE: 97.3 F | SYSTOLIC BLOOD PRESSURE: 152 MMHG | DIASTOLIC BLOOD PRESSURE: 88 MMHG | HEART RATE: 58 BPM | OXYGEN SATURATION: 93 %

## 2021-10-07 DIAGNOSIS — Z00.00 PREVENTATIVE HEALTH CARE: ICD-10-CM

## 2021-10-07 DIAGNOSIS — E78.2 MIXED HYPERLIPIDEMIA: ICD-10-CM

## 2021-10-07 DIAGNOSIS — Z12.11 COLON CANCER SCREENING: Primary | ICD-10-CM

## 2021-10-07 DIAGNOSIS — L98.9 SKIN LESION OF CHEST WALL: ICD-10-CM

## 2021-10-07 PROCEDURE — 1125F AMNT PAIN NOTED PAIN PRSNT: CPT | Performed by: NURSE PRACTITIONER

## 2021-10-07 PROCEDURE — 1160F RVW MEDS BY RX/DR IN RCRD: CPT | Performed by: NURSE PRACTITIONER

## 2021-10-07 PROCEDURE — G0439 PPPS, SUBSEQ VISIT: HCPCS | Performed by: NURSE PRACTITIONER

## 2021-10-07 PROCEDURE — 96160 PT-FOCUSED HLTH RISK ASSMT: CPT | Performed by: NURSE PRACTITIONER

## 2021-10-07 PROCEDURE — 99213 OFFICE O/P EST LOW 20 MIN: CPT | Performed by: NURSE PRACTITIONER

## 2021-10-07 PROCEDURE — 1170F FXNL STATUS ASSESSED: CPT | Performed by: NURSE PRACTITIONER

## 2021-10-07 RX ORDER — BISOPROLOL FUMARATE AND HYDROCHLOROTHIAZIDE 10; 6.25 MG/1; MG/1
1 TABLET ORAL DAILY
Qty: 90 TABLET | Refills: 1 | Status: SHIPPED | OUTPATIENT
Start: 2021-10-07 | End: 2022-05-31 | Stop reason: SDUPTHER

## 2021-10-07 RX ORDER — FENOFIBRATE 145 MG/1
145 TABLET, COATED ORAL DAILY
Qty: 90 TABLET | Refills: 1 | Status: SHIPPED | OUTPATIENT
Start: 2021-10-07 | End: 2022-05-31 | Stop reason: SDUPTHER

## 2021-10-07 RX ORDER — ATORVASTATIN CALCIUM 40 MG/1
40 TABLET, FILM COATED ORAL
Qty: 90 TABLET | Refills: 1 | Status: SHIPPED | OUTPATIENT
Start: 2021-10-07 | End: 2022-05-31 | Stop reason: SDUPTHER

## 2021-10-07 RX ORDER — MELOXICAM 15 MG/1
15 TABLET ORAL DAILY
Qty: 90 TABLET | Refills: 1 | Status: SHIPPED | OUTPATIENT
Start: 2021-10-07 | End: 2022-05-31 | Stop reason: SDUPTHER

## 2021-10-07 RX ORDER — LISINOPRIL 10 MG/1
10 TABLET ORAL DAILY
Qty: 90 TABLET | Refills: 1 | Status: SHIPPED | OUTPATIENT
Start: 2021-10-07 | End: 2022-05-31 | Stop reason: SDUPTHER

## 2021-10-07 RX ORDER — DULOXETIN HYDROCHLORIDE 60 MG/1
60 CAPSULE, DELAYED RELEASE ORAL DAILY
Qty: 90 CAPSULE | Refills: 1 | Status: SHIPPED | OUTPATIENT
Start: 2021-10-07 | End: 2022-04-05

## 2021-10-07 NOTE — PROGRESS NOTES
"    Shelley Black is a 76 y.o. female.     76-year-old white female with history of hypertension, chronic arthritis pain, psoriasis and allergic rhinitis who comes in today for Medicare wellness visit  Blood pressure 152/88 heart rate 58 with murmur she denies any chest pain, dyspnea, tachycardia or dizziness    Patient has a lesion above her right breast that she is states started out as a white bump but now it is spread out into a reddish colored cluster and is painful to touch but no erythema.  This is a very suspicious looking lesion I am referring her to dermatology    Patient's last triglycerides were 354 and we will will be doing fasting blood work today.  I also ordered her a home Cologuard which she states she took to [facility] to send in but for whatever reason they did not receive it.  So I am reordering her Cologuard for her.  She is up-to-date on Covid influenza Pneumovax vaccines, DEXA scan and mammogram and eye exam.  She no longer does Pap smears.  I am ordering hep C today  Weight is 168 which is down 2 pounds with a BMI of 28            Fasting blood work  Home Cologuard  Dermatology referral   Follow-up 6 months fasting       The following portions of the patient's history were reviewed and updated as appropriate: allergies, current medications, past family history, past medical history, past social history, past surgical history and problem list.    Vitals:    10/07/21 1027   BP: 152/88   BP Location: Right arm   Patient Position: Sitting   Cuff Size: Large Adult   Pulse: 58   Temp: 97.3 °F (36.3 °C)   TempSrc: Temporal   SpO2: 93%   Weight: 76.2 kg (168 lb)   Height: 165.1 cm (65\")     Body mass index is 27.96 kg/m².    Past Medical History:   Diagnosis Date   • Depression    • Hyperlipidemia    • Hypertension      Past Surgical History:   Procedure Laterality Date   •  SECTION       Family History   Problem Relation Age of Onset   • Cancer Mother    • Cancer Father    • Heart disease Father "      Immunization History   Administered Date(s) Administered   • COVID-19 (MODERNA) 02/19/2021, 03/19/2021   • Flu Vaccine Quad PF 6-35MO 01/16/2019   • Fluad Quad 65+ 10/10/2019   • Fluzone High Dose =>65 Years ([employer] ONLY) 11/16/2017   • Influenza Quad Vaccine (Inpatient) 01/11/2013   • Influenza, Unspecified 09/12/2013, 12/10/2015, 10/10/2019   • Pneumococcal Conjugate 13-Valent (PCV13) 10/10/2019   • Pneumococcal Polysaccharide (PPSV23) 09/12/2013       Office Visit on 04/07/2021   Component Date Value Ref Range Status   • WBC 04/07/2021 7.7  3.4 - 10.8 x10E3/uL Final   • RBC 04/07/2021 4.94  3.77 - 5.28 x10E6/uL Final   • Hemoglobin 04/07/2021 14.4  11.1 - 15.9 g/dL Final   • Hematocrit 04/07/2021 44.6  34.0 - 46.6 % Final   • MCV 04/07/2021 90  79 - 97 fL Final   • MCH 04/07/2021 29.1  26.6 - 33.0 pg Final   • MCHC 04/07/2021 32.3  31.5 - 35.7 g/dL Final   • RDW 04/07/2021 13.9  11.7 - 15.4 % Final   • Platelets 04/07/2021 239  150 - 450 x10E3/uL Final   • Neutrophil Rel % 04/07/2021 67  Not Estab. % Final   • Lymphocyte Rel % 04/07/2021 21  Not Estab. % Final   • Monocyte Rel % 04/07/2021 6  Not Estab. % Final   • Eosinophil Rel % 04/07/2021 4  Not Estab. % Final   • Basophil Rel % 04/07/2021 1  Not Estab. % Final   • Neutrophils Absolute 04/07/2021 5.2  1.4 - 7.0 x10E3/uL Final   • Lymphocytes Absolute 04/07/2021 1.6  0.7 - 3.1 x10E3/uL Final   • Monocytes Absolute 04/07/2021 0.5  0.1 - 0.9 x10E3/uL Final   • Eosinophils Absolute 04/07/2021 0.3  0.0 - 0.4 x10E3/uL Final   • Basophils Absolute 04/07/2021 0.1  0.0 - 0.2 x10E3/uL Final   • Immature Granulocyte Rel % 04/07/2021 1  Not Estab. % Final   • Immature Grans Absolute 04/07/2021 0.0  0.0 - 0.1 x10E3/uL Final   • Glucose 04/07/2021 97  65 - 99 mg/dL Final   • BUN 04/07/2021 27  8 - 27 mg/dL Final   • Creatinine 04/07/2021 0.84  0.57 - 1.00 mg/dL Final   • eGFR Non  Am 04/07/2021 69  >59 mL/min/1.73 Final   • eGFR African Am 04/07/2021 79  >59  mL/min/1.73 Final   • BUN/Creatinine Ratio 04/07/2021 32* 12 - 28 Final   • Sodium 04/07/2021 141  134 - 144 mmol/L Final   • Potassium 04/07/2021 4.3  3.5 - 5.2 mmol/L Final   • Chloride 04/07/2021 107* 96 - 106 mmol/L Final   • Total CO2 04/07/2021 21  20 - 29 mmol/L Final   • Calcium 04/07/2021 10.0  8.7 - 10.3 mg/dL Final   • Total Protein 04/07/2021 7.0  6.0 - 8.5 g/dL Final   • Albumin 04/07/2021 4.7  3.7 - 4.7 g/dL Final   • Globulin 04/07/2021 2.3  1.5 - 4.5 g/dL Final   • A/G Ratio 04/07/2021 2.0  1.2 - 2.2 Final   • Total Bilirubin 04/07/2021 0.5  0.0 - 1.2 mg/dL Final   • Alkaline Phosphatase 04/07/2021 73  39 - 117 IU/L Final   • AST (SGOT) 04/07/2021 17  0 - 40 IU/L Final   • ALT (SGPT) 04/07/2021 16  0 - 32 IU/L Final   • Total Cholesterol 04/07/2021 174  100 - 199 mg/dL Final   • Triglycerides 04/07/2021 354* 0 - 149 mg/dL Final   • HDL Cholesterol 04/07/2021 42  >39 mg/dL Final   • VLDL Cholesterol Siva 04/07/2021 57* 5 - 40 mg/dL Final   • LDL Chol Calc ([facility]) 04/07/2021 75  0 - 99 mg/dL Final   • LDL/HDL RATIO 04/07/2021 1.8  0.0 - 3.2 ratio Final    Comment:                                     LDL/HDL Ratio                                              Men  Women                                1/2 Avg.Risk  1.0    1.5                                    Avg.Risk  3.6    3.2                                 2X Avg.Risk  6.2    5.0                                 3X Avg.Risk  8.0    6.1           Review of Systems   Constitutional: Negative.    HENT: Negative.    Respiratory: Negative.    Genitourinary: Negative.    Musculoskeletal: Negative.    Skin: Positive for skin lesions.   Psychiatric/Behavioral: Negative.        Objective   Physical Exam  Constitutional:       Appearance: Normal appearance.   HENT:      Head: Normocephalic.   Cardiovascular:      Rate and Rhythm: Normal rate and regular rhythm.      Pulses: Normal pulses.      Heart sounds: Normal heart sounds.   Pulmonary:      Effort: Pulmonary  effort is normal.      Breath sounds: Normal breath sounds.   Abdominal:      General: Bowel sounds are normal.   Musculoskeletal:         General: Normal range of motion.   Skin:     Comments: Red raised cluster-like lesion that is painful to touch with no surrounding erythema   Neurological:      General: No focal deficit present.      Mental Status: She is alert and oriented to person, place, and time.   Psychiatric:         Mood and Affect: Mood normal.         Behavior: Behavior normal.         Procedures    Assessment/Plan   Diagnoses and all orders for this visit:    1. Colon cancer screening (Primary)  -     Cologuard - Stool, Per Rectum; Future    2. Mixed hyperlipidemia  -     Lipid Panel With LDL / HDL Ratio    3. Preventative health care  -     Comprehensive Metabolic Panel  -     Hepatitis C antibody; Future    4. Skin lesion of chest wall    Other orders  -     atorvastatin (LIPITOR) 40 MG tablet; Take 1 tablet by mouth every night at bedtime.  Dispense: 90 tablet; Refill: 1  -     bisoprolol-hydrochlorothiazide (ZIAC) 10-6.25 MG per tablet; Take 1 tablet by mouth Daily.  Dispense: 90 tablet; Refill: 1  -     DULoxetine (CYMBALTA) 60 MG capsule; Take 1 capsule by mouth Daily.  Dispense: 90 capsule; Refill: 1  -     fenofibrate (TRICOR) 145 MG tablet; Take 1 tablet by mouth Daily.  Dispense: 90 tablet; Refill: 1  -     lisinopril (PRINIVIL,ZESTRIL) 10 MG tablet; Take 1 tablet by mouth Daily.  Dispense: 90 tablet; Refill: 1  -     meloxicam (MOBIC) 15 MG tablet; Take 1 tablet by mouth Daily.  Dispense: 90 tablet; Refill: 1          Current Outpatient Medications:   •  atorvastatin (LIPITOR) 40 MG tablet, Take 1 tablet by mouth every night at bedtime., Disp: 90 tablet, Rfl: 1  •  bisoprolol-hydrochlorothiazide (ZIAC) 10-6.25 MG per tablet, Take 1 tablet by mouth Daily., Disp: 90 tablet, Rfl: 1  •  DULoxetine (CYMBALTA) 60 MG capsule, Take 1 capsule by mouth Daily., Disp: 90 capsule, Rfl: 1  •  fenofibrate  (TRICOR) 145 MG tablet, Take 1 tablet by mouth Daily., Disp: 90 tablet, Rfl: 1  •  lisinopril (PRINIVIL,ZESTRIL) 10 MG tablet, Take 1 tablet by mouth Daily., Disp: 90 tablet, Rfl: 1  •  meloxicam (MOBIC) 15 MG tablet, Take 1 tablet by mouth Daily., Disp: 90 tablet, Rfl: 1

## 2021-10-08 LAB
ALBUMIN SERPL-MCNC: 4.8 G/DL (ref 3.7–4.7)
ALBUMIN/GLOB SERPL: 2.2 {RATIO} (ref 1.2–2.2)
ALP SERPL-CCNC: 78 IU/L (ref 44–121)
ALT SERPL-CCNC: 17 IU/L (ref 0–32)
AST SERPL-CCNC: 17 IU/L (ref 0–40)
BILIRUB SERPL-MCNC: 0.5 MG/DL (ref 0–1.2)
BUN SERPL-MCNC: 28 MG/DL (ref 8–27)
BUN/CREAT SERPL: 33 (ref 12–28)
CALCIUM SERPL-MCNC: 10.1 MG/DL (ref 8.7–10.3)
CHLORIDE SERPL-SCNC: 104 MMOL/L (ref 96–106)
CHOLEST SERPL-MCNC: 150 MG/DL (ref 100–199)
CO2 SERPL-SCNC: 23 MMOL/L (ref 20–29)
CREAT SERPL-MCNC: 0.85 MG/DL (ref 0.57–1)
GLOBULIN SER CALC-MCNC: 2.2 G/DL (ref 1.5–4.5)
GLUCOSE SERPL-MCNC: 99 MG/DL (ref 65–99)
HDLC SERPL-MCNC: 35 MG/DL
LDLC SERPL CALC-MCNC: 77 MG/DL (ref 0–99)
LDLC/HDLC SERPL: 2.2 RATIO (ref 0–3.2)
POTASSIUM SERPL-SCNC: 4.5 MMOL/L (ref 3.5–5.2)
PROT SERPL-MCNC: 7 G/DL (ref 6–8.5)
SODIUM SERPL-SCNC: 142 MMOL/L (ref 134–144)
TRIGL SERPL-MCNC: 231 MG/DL (ref 0–149)
VLDLC SERPL CALC-MCNC: 38 MG/DL (ref 5–40)

## 2022-04-05 RX ORDER — DULOXETIN HYDROCHLORIDE 60 MG/1
CAPSULE, DELAYED RELEASE ORAL
Qty: 90 CAPSULE | Refills: 0 | Status: SHIPPED | OUTPATIENT
Start: 2022-04-05 | End: 2022-05-31 | Stop reason: SDUPTHER

## 2022-04-25 RX ORDER — BISOPROLOL FUMARATE AND HYDROCHLOROTHIAZIDE 10; 6.25 MG/1; MG/1
TABLET ORAL
Qty: 90 TABLET | Refills: 3 | OUTPATIENT
Start: 2022-04-25

## 2022-04-27 RX ORDER — FENOFIBRATE 145 MG/1
TABLET, COATED ORAL
Qty: 90 TABLET | Refills: 3 | OUTPATIENT
Start: 2022-04-27

## 2022-05-17 RX ORDER — MELOXICAM 15 MG/1
TABLET ORAL
Qty: 90 TABLET | Refills: 3 | OUTPATIENT
Start: 2022-05-17

## 2022-05-17 RX ORDER — LISINOPRIL 10 MG/1
TABLET ORAL
Qty: 90 TABLET | Refills: 3 | OUTPATIENT
Start: 2022-05-17

## 2022-05-31 ENCOUNTER — OFFICE VISIT (OUTPATIENT)
Dept: FAMILY MEDICINE CLINIC | Facility: CLINIC | Age: 77
End: 2022-05-31

## 2022-05-31 VITALS
SYSTOLIC BLOOD PRESSURE: 180 MMHG | HEIGHT: 65 IN | WEIGHT: 161.8 LBS | BODY MASS INDEX: 26.96 KG/M2 | OXYGEN SATURATION: 97 % | DIASTOLIC BLOOD PRESSURE: 90 MMHG | TEMPERATURE: 97.3 F | HEART RATE: 59 BPM

## 2022-05-31 DIAGNOSIS — M25.562 CHRONIC PAIN OF LEFT KNEE: ICD-10-CM

## 2022-05-31 DIAGNOSIS — G89.29 CHRONIC PAIN OF LEFT KNEE: ICD-10-CM

## 2022-05-31 DIAGNOSIS — E78.2 MIXED HYPERLIPIDEMIA: Primary | ICD-10-CM

## 2022-05-31 DIAGNOSIS — Z78.0 POSTMENOPAUSAL: ICD-10-CM

## 2022-05-31 DIAGNOSIS — M17.12 OSTEOARTHRITIS OF LEFT KNEE, UNSPECIFIED OSTEOARTHRITIS TYPE: Primary | ICD-10-CM

## 2022-05-31 DIAGNOSIS — E78.1 HIGH TRIGLYCERIDES: ICD-10-CM

## 2022-05-31 DIAGNOSIS — Z12.31 OTHER SCREENING MAMMOGRAM: ICD-10-CM

## 2022-05-31 DIAGNOSIS — I10 PRIMARY HYPERTENSION: ICD-10-CM

## 2022-05-31 DIAGNOSIS — Z00.00 PREVENTATIVE HEALTH CARE: ICD-10-CM

## 2022-05-31 PROCEDURE — 99214 OFFICE O/P EST MOD 30 MIN: CPT | Performed by: NURSE PRACTITIONER

## 2022-05-31 RX ORDER — BISOPROLOL FUMARATE AND HYDROCHLOROTHIAZIDE 10; 6.25 MG/1; MG/1
1 TABLET ORAL DAILY
Qty: 90 TABLET | Refills: 1 | Status: SHIPPED | OUTPATIENT
Start: 2022-05-31 | End: 2022-11-28

## 2022-05-31 RX ORDER — AMLODIPINE BESYLATE 2.5 MG/1
2.5 TABLET ORAL
Qty: 30 TABLET | Refills: 0 | Status: SHIPPED | OUTPATIENT
Start: 2022-05-31 | End: 2022-07-06

## 2022-05-31 RX ORDER — DULOXETIN HYDROCHLORIDE 60 MG/1
60 CAPSULE, DELAYED RELEASE ORAL DAILY
Qty: 90 CAPSULE | Refills: 1 | Status: SHIPPED | OUTPATIENT
Start: 2022-05-31 | End: 2022-11-30 | Stop reason: SDUPTHER

## 2022-05-31 RX ORDER — LISINOPRIL 10 MG/1
10 TABLET ORAL DAILY
Qty: 90 TABLET | Refills: 1 | Status: SHIPPED | OUTPATIENT
Start: 2022-05-31 | End: 2022-05-31 | Stop reason: ALTCHOICE

## 2022-05-31 RX ORDER — FENOFIBRATE 145 MG/1
145 TABLET, COATED ORAL DAILY
Qty: 90 TABLET | Refills: 1 | Status: SHIPPED | OUTPATIENT
Start: 2022-05-31 | End: 2022-11-09 | Stop reason: SDUPTHER

## 2022-05-31 RX ORDER — ATORVASTATIN CALCIUM 40 MG/1
40 TABLET, FILM COATED ORAL
Qty: 90 TABLET | Refills: 1 | Status: SHIPPED | OUTPATIENT
Start: 2022-05-31 | End: 2022-11-28

## 2022-05-31 RX ORDER — MELOXICAM 15 MG/1
15 TABLET ORAL DAILY
Qty: 90 TABLET | Refills: 1 | Status: SHIPPED | OUTPATIENT
Start: 2022-05-31 | End: 2022-06-13 | Stop reason: ALTCHOICE

## 2022-05-31 NOTE — PATIENT INSTRUCTIONS
Fasting blood work  Cologuard  Stop lisinopril  Amlodipine 2.5 mg daily  Monitor blood pressure with parameters given  Left knee x-ray  Mammogram/DEXA scan  Follow-up 6 months

## 2022-05-31 NOTE — PROGRESS NOTES
"    Shelley Black is a 77 y.o. female.     77-year-old white female with hypertension, chronic arthritis pain, psoriasis and allergic rhinitis who comes in today for follow-up visit    Blood pressure elevated today 180/90 heart rate 58 with small systolic murmur the patient states that has been running higher lately than normal.  I am little stop lisinopril put her on a low-dose of amlodipine 2.5 mg.  Patient is to monitor blood pressure with parameters given and let me know if blood pressures do not remain in most parameters    Patient also has had left knee pain for couple months with some swelling.  We will do an x-ray today and possible referral to Ortho for injections.  She states meloxicam has not helped her knee    Weight is down 6 pounds at 162 with a BMI 27.  She has had 2 COVID vaccines up-to-date on eye exam and sent a Cologuard again but they stated they never got it.  We will reorder Cologuard and am scheduling her mammogram and DEXA scan            Fasting blood work  Cologuard  Stop lisinopril  Amlodipine 2.5 mg daily  Monitor blood pressure with parameters given  Left knee x-ray  Mammogram/DEXA scan  Follow-up 6 months       The following portions of the patient's history were reviewed and updated as appropriate: allergies, current medications, past family history, past medical history, past social history, past surgical history and problem list.    Vitals:    22 0918   BP: 180/90   BP Location: Left arm   Patient Position: Sitting   Cuff Size: Adult   Pulse: 59   Temp: 97.3 °F (36.3 °C)   TempSrc: Temporal   SpO2: 97%   Weight: 73.4 kg (161 lb 12.8 oz)   Height: 165.1 cm (65\")     Body mass index is 26.92 kg/m².    Past Medical History:   Diagnosis Date   • Depression    • Hyperlipidemia    • Hypertension      Past Surgical History:   Procedure Laterality Date   •  SECTION       Family History   Problem Relation Age of Onset   • Cancer Mother    • Cancer Father    • Heart disease " Father      Immunization History   Administered Date(s) Administered   • COVID-19 (MODERNA) 1st, 2nd, 3rd Dose Only 02/19/2021, 03/19/2021   • Flu Vaccine Quad PF 6-35MO 01/16/2019   • Fluad Quad 65+ 10/10/2019   • Fluzone High Dose =>65 Years ([facility] ONLY) 11/16/2017   • Influenza Quad Vaccine (Inpatient) 01/11/2013   • Influenza, Unspecified 09/12/2013, 12/10/2015, 10/10/2019   • Pneumococcal Conjugate 13-Valent (PCV13) 10/10/2019   • Pneumococcal Polysaccharide (PPSV23) 09/12/2013       Office Visit on 10/07/2021   Component Date Value Ref Range Status   • Glucose 10/07/2021 99  65 - 99 mg/dL Final   • BUN 10/07/2021 28 (A) 8 - 27 mg/dL Final   • Creatinine 10/07/2021 0.85  0.57 - 1.00 mg/dL Final   • eGFR Non  Am 10/07/2021 68  >59 mL/min/1.73 Final   • eGFR African Am 10/07/2021 78  >59 mL/min/1.73 Final    Comment: **[facility] currently reports eGFR in compliance with the current**    recommendations of the National Kid[employer] Foundation. [facility] will    update reporting as new guidelines are published from the NKF-ASN    Task force.     • BUN/Creatinine Ratio 10/07/2021 33 (A) 12 - 28 Final   • Sodium 10/07/2021 142  134 - 144 mmol/L Final   • Potassium 10/07/2021 4.5  3.5 - 5.2 mmol/L Final   • Chloride 10/07/2021 104  96 - 106 mmol/L Final   • Total CO2 10/07/2021 23  20 - 29 mmol/L Final   • Calcium 10/07/2021 10.1  8.7 - 10.3 mg/dL Final   • Total Protein 10/07/2021 7.0  6.0 - 8.5 g/dL Final   • Albumin 10/07/2021 4.8 (A) 3.7 - 4.7 g/dL Final   • Globulin 10/07/2021 2.2  1.5 - 4.5 g/dL Final   • A/G Ratio 10/07/2021 2.2  1.2 - 2.2 Final   • Total Bilirubin 10/07/2021 0.5  0.0 - 1.2 mg/dL Final   • Alkaline Phosphatase 10/07/2021 78  44 - 121 IU/L Final                  **Please note reference interval change**   • AST (SGOT) 10/07/2021 17  0 - 40 IU/L Final   • ALT (SGPT) 10/07/2021 17  0 - 32 IU/L Final   • Total Cholesterol 10/07/2021 150  100 - 199 mg/dL Final   • Triglycerides 10/07/2021 231 (A) 0 -  149 mg/dL Final   • HDL Cholesterol 10/07/2021 35 (A) >39 mg/dL Final   • VLDL Cholesterol Siva 10/07/2021 38  5 - 40 mg/dL Final   • LDL Chol Calc (NIH) 10/07/2021 77  0 - 99 mg/dL Final   • LDL/HDL RATIO 10/07/2021 2.2  0.0 - 3.2 ratio Final    Comment:                                     LDL/HDL Ratio                                              Men  Women                                1/2 Avg.Risk  1.0    1.5                                    Avg.Risk  3.6    3.2                                 2X Avg.Risk  6.2    5.0                                 3X Avg.Risk  8.0    6.1           Review of Systems   Constitutional: Negative.    HENT: Negative.    Respiratory: Negative.    Cardiovascular: Negative.    Gastrointestinal: Negative.    Genitourinary: Negative.    Musculoskeletal:        Left knee pain   Skin: Negative.    Neurological: Negative.    Psychiatric/Behavioral: Negative.        Objective   Physical Exam  Constitutional:       Appearance: Normal appearance.   HENT:      Head: Normocephalic.   Cardiovascular:      Rate and Rhythm: Normal rate and regular rhythm.      Pulses: Normal pulses.      Heart sounds: Normal heart sounds.   Pulmonary:      Effort: Pulmonary effort is normal.   Abdominal:      General: Bowel sounds are normal.   Musculoskeletal:      Comments: Left knee puffy and patient states it hurts at rest and with weightbearing   Skin:     General: Skin is warm and dry.   Neurological:      General: No focal deficit present.      Mental Status: She is alert and oriented to person, place, and time.   Psychiatric:         Mood and Affect: Mood normal.         Behavior: Behavior normal.         Procedures    Assessment & Plan   Diagnoses and all orders for this visit:    1. Mixed hyperlipidemia (Primary)  -     Comprehensive Metabolic Panel    2. High triglycerides  -     Lipid Panel With LDL / HDL Ratio    3. Preventative health care  -     CBC & Differential    4. Chronic pain of left knee  -      XR Knee 1 or 2 View Left    5. Primary hypertension    Other orders  -     atorvastatin (LIPITOR) 40 MG tablet; Take 1 tablet by mouth every night at bedtime.  Dispense: 90 tablet; Refill: 1  -     bisoprolol-hydrochlorothiazide (ZIAC) 10-6.25 MG per tablet; Take 1 tablet by mouth Daily.  Dispense: 90 tablet; Refill: 1  -     DULoxetine (CYMBALTA) 60 MG capsule; Take 1 capsule by mouth Daily.  Dispense: 90 capsule; Refill: 1  -     fenofibrate (TRICOR) 145 MG tablet; Take 1 tablet by mouth Daily.  Dispense: 90 tablet; Refill: 1  -     Discontinue: lisinopril (PRINIVIL,ZESTRIL) 10 MG tablet; Take 1 tablet by mouth Daily.  Dispense: 90 tablet; Refill: 1  -     meloxicam (MOBIC) 15 MG tablet; Take 1 tablet by mouth Daily.  Dispense: 90 tablet; Refill: 1  -     amLODIPine (NORVASC) 2.5 MG tablet; Take 1 tablet by mouth every night at bedtime.  Dispense: 30 tablet; Refill: 0          Current Outpatient Medications:   •  atorvastatin (LIPITOR) 40 MG tablet, Take 1 tablet by mouth every night at bedtime., Disp: 90 tablet, Rfl: 1  •  bisoprolol-hydrochlorothiazide (ZIAC) 10-6.25 MG per tablet, Take 1 tablet by mouth Daily., Disp: 90 tablet, Rfl: 1  •  DULoxetine (CYMBALTA) 60 MG capsule, Take 1 capsule by mouth Daily., Disp: 90 capsule, Rfl: 1  •  fenofibrate (TRICOR) 145 MG tablet, Take 1 tablet by mouth Daily., Disp: 90 tablet, Rfl: 1  •  meloxicam (MOBIC) 15 MG tablet, Take 1 tablet by mouth Daily., Disp: 90 tablet, Rfl: 1  •  amLODIPine (NORVASC) 2.5 MG tablet, Take 1 tablet by mouth every night at bedtime., Disp: 30 tablet, Rfl: 0           Marley Santos, STEPHANY 5/31/2022 09:42 EDT  This note has been electronically signed

## 2022-06-01 LAB
ALBUMIN SERPL-MCNC: 4.6 G/DL (ref 3.7–4.7)
ALBUMIN/GLOB SERPL: 2.2 {RATIO} (ref 1.2–2.2)
ALP SERPL-CCNC: 88 IU/L (ref 44–121)
ALT SERPL-CCNC: 15 IU/L (ref 0–32)
AST SERPL-CCNC: 14 IU/L (ref 0–40)
BASOPHILS # BLD AUTO: 0.1 X10E3/UL (ref 0–0.2)
BASOPHILS NFR BLD AUTO: 1 %
BILIRUB SERPL-MCNC: 0.5 MG/DL (ref 0–1.2)
BUN SERPL-MCNC: 28 MG/DL (ref 8–27)
BUN/CREAT SERPL: 33 (ref 12–28)
CALCIUM SERPL-MCNC: 10.3 MG/DL (ref 8.7–10.3)
CHLORIDE SERPL-SCNC: 103 MMOL/L (ref 96–106)
CHOLEST SERPL-MCNC: 164 MG/DL (ref 100–199)
CO2 SERPL-SCNC: 23 MMOL/L (ref 20–29)
CREAT SERPL-MCNC: 0.85 MG/DL (ref 0.57–1)
EGFRCR SERPLBLD CKD-EPI 2021: 71 ML/MIN/1.73
EOSINOPHIL # BLD AUTO: 0.3 X10E3/UL (ref 0–0.4)
EOSINOPHIL NFR BLD AUTO: 4 %
ERYTHROCYTE [DISTWIDTH] IN BLOOD BY AUTOMATED COUNT: 13 % (ref 11.7–15.4)
GLOBULIN SER CALC-MCNC: 2.1 G/DL (ref 1.5–4.5)
GLUCOSE SERPL-MCNC: 98 MG/DL (ref 65–99)
HCT VFR BLD AUTO: 47.6 % (ref 34–46.6)
HDLC SERPL-MCNC: 39 MG/DL
HGB BLD-MCNC: 15 G/DL (ref 11.1–15.9)
IMM GRANULOCYTES # BLD AUTO: 0 X10E3/UL (ref 0–0.1)
IMM GRANULOCYTES NFR BLD AUTO: 0 %
LDLC SERPL CALC-MCNC: 85 MG/DL (ref 0–99)
LDLC/HDLC SERPL: 2.2 RATIO (ref 0–3.2)
LYMPHOCYTES # BLD AUTO: 1.5 X10E3/UL (ref 0.7–3.1)
LYMPHOCYTES NFR BLD AUTO: 20 %
MCH RBC QN AUTO: 28.1 PG (ref 26.6–33)
MCHC RBC AUTO-ENTMCNC: 31.5 G/DL (ref 31.5–35.7)
MCV RBC AUTO: 89 FL (ref 79–97)
MONOCYTES # BLD AUTO: 0.4 X10E3/UL (ref 0.1–0.9)
MONOCYTES NFR BLD AUTO: 6 %
NEUTROPHILS # BLD AUTO: 5.2 X10E3/UL (ref 1.4–7)
NEUTROPHILS NFR BLD AUTO: 69 %
PLATELET # BLD AUTO: 269 X10E3/UL (ref 150–450)
POTASSIUM SERPL-SCNC: 4.7 MMOL/L (ref 3.5–5.2)
PROT SERPL-MCNC: 6.7 G/DL (ref 6–8.5)
RBC # BLD AUTO: 5.33 X10E6/UL (ref 3.77–5.28)
SODIUM SERPL-SCNC: 143 MMOL/L (ref 134–144)
TRIGL SERPL-MCNC: 237 MG/DL (ref 0–149)
VLDLC SERPL CALC-MCNC: 40 MG/DL (ref 5–40)
WBC # BLD AUTO: 7.5 X10E3/UL (ref 3.4–10.8)

## 2022-06-13 ENCOUNTER — OFFICE VISIT (OUTPATIENT)
Dept: ORTHOPEDIC SURGERY | Facility: CLINIC | Age: 77
End: 2022-06-13

## 2022-06-13 VITALS
BODY MASS INDEX: 27.09 KG/M2 | SYSTOLIC BLOOD PRESSURE: 176 MMHG | WEIGHT: 162.6 LBS | DIASTOLIC BLOOD PRESSURE: 83 MMHG | HEIGHT: 65 IN | HEART RATE: 62 BPM

## 2022-06-13 DIAGNOSIS — M17.12 PRIMARY OSTEOARTHRITIS OF LEFT KNEE: Primary | ICD-10-CM

## 2022-06-13 DIAGNOSIS — E66.3 OVERWEIGHT (BMI 25.0-29.9): ICD-10-CM

## 2022-06-13 PROCEDURE — 20610 DRAIN/INJ JOINT/BURSA W/O US: CPT | Performed by: PHYSICIAN ASSISTANT

## 2022-06-13 PROCEDURE — 99213 OFFICE O/P EST LOW 20 MIN: CPT | Performed by: PHYSICIAN ASSISTANT

## 2022-06-13 RX ORDER — LIDOCAINE HYDROCHLORIDE 10 MG/ML
2 INJECTION, SOLUTION INFILTRATION; PERINEURAL
Status: COMPLETED | OUTPATIENT
Start: 2022-06-13 | End: 2022-06-13

## 2022-06-13 RX ORDER — DICLOFENAC SODIUM 75 MG/1
75 TABLET, DELAYED RELEASE ORAL 2 TIMES DAILY
Qty: 60 TABLET | Refills: 2 | Status: SHIPPED | OUTPATIENT
Start: 2022-06-13 | End: 2022-11-07

## 2022-06-13 RX ORDER — TRIAMCINOLONE ACETONIDE 40 MG/ML
80 INJECTION, SUSPENSION INTRA-ARTICULAR; INTRAMUSCULAR
Status: COMPLETED | OUTPATIENT
Start: 2022-06-13 | End: 2022-06-13

## 2022-06-13 RX ADMIN — TRIAMCINOLONE ACETONIDE 80 MG: 40 INJECTION, SUSPENSION INTRA-ARTICULAR; INTRAMUSCULAR at 09:48

## 2022-06-13 RX ADMIN — LIDOCAINE HYDROCHLORIDE 2 ML: 10 INJECTION, SOLUTION INFILTRATION; PERINEURAL at 09:48

## 2022-06-13 NOTE — PATIENT INSTRUCTIONS
"Osteoarthritis    Osteoarthritis is a type of arthritis. It refers to joint pain or joint disease. Osteoarthritis affects tissue that covers the ends of bones in joints (cartilage). Cartilage acts as a cushion between the bones and helps them move smoothly. Osteoarthritis occurs when cartilage in the joints gets worn down. Osteoarthritis is sometimes called \"wear and tear\" arthritis.  Osteoarthritis is the most common form of arthritis. It often occurs in older people. It is a condition that gets worse over time. The joints most often affected by this condition are in the fingers, toes, hips, knees, and spine, including the neck and lower back.  What are the causes?  This condition is caused by the wearing down of cartilage that covers the ends of bones.  What increases the risk?  The following factors may make you more likely to develop this condition:  Being age 50 or older.  Obesity.  Overuse of joints.  Past injury of a joint.  Past surgery on a joint.  Family history of osteoarthritis.  What are the signs or symptoms?  The main symptoms of this condition are pain, swelling, and stiffness in the joint. Other symptoms may include:  An enlarged joint.  More pain and further damage caused by small pieces of bone or cartilage that break off and float inside of the joint.  Small deposits of bone (osteophytes) that grow on the edges of the joint.  A grating or scraping feeling inside the joint when you move it.  Popping or creaking sounds when you move.  Difficulty walking or exercising.  An inability to  items, twist your hand(s), or control the movements of your hands and fingers.  How is this diagnosed?  This condition may be diagnosed based on:  Your medical history.  A physical exam.  Your symptoms.  X-rays of the affected joint(s).  Blood tests to rule out other types of arthritis.  How is this treated?  There is no cure for this condition, but treatment can help control pain and improve joint function. " Treatment may include a combination of therapies, such as:  Pain relief techniques, such as:  Applying heat and cold to the joint.  Massage.  A form of talk therapy called cognitive behavioral therapy (CBT). This therapy helps you set goals and follow up on the changes that you make.  Medicines for pain and inflammation. The medicines can be taken by mouth or applied to the skin. They include:  NSAIDs, such as ibuprofen.  Prescription medicines.  Strong anti-inflammatory medicines (corticosteroids).  Certain nutritional supplements.  A prescribed exercise program. You may work with a physical therapist.  Assistive devices, such as a brace, wrap, splint, specialized glove, or cane.  A weight control plan.  Surgery, such as:  An osteotomy. This is done to reposition the bones and relieve pain or to remove loose pieces of bone and cartilage.  Joint replacement surgery. You may need this surgery if you have advanced osteoarthritis.  Follow these instructions at home:  Activity  Rest your affected joints as told by your health care provider.  Exercise as told by your health care provider. He or she may recommend specific types of exercise, such as:  Strengthening exercises. These are done to strengthen the muscles that support joints affected by arthritis.  Aerobic activities. These are exercises, such as brisk walking or water aerobics, that increase your heart rate.  Range-of-motion activities. These help your joints move more easily.  Balance and agility exercises.  Managing pain, stiffness, and swelling         If directed, apply heat to the affected area as often as told by your health care provider. Use the heat source that your health care provider recommends, such as a moist heat pack or a heating pad.  If you have a removable assistive device, remove it as told by your health care provider.  Place a towel between your skin and the heat source. If your health care provider tells you to keep the assistive device  on while you apply heat, place a towel between the assistive device and the heat source.  Leave the heat on for 20-30 minutes.  Remove the heat if your skin turns bright red. This is especially important if you are unable to feel pain, heat, or cold. You may have a greater risk of getting burned.  If directed, put ice on the affected area. To do this:  If you have a removable assistive device, remove it as told by your health care provider.  Put ice in a plastic bag.  Place a towel between your skin and the bag. If your health care provider tells you to keep the assistive device on during icing, place a towel between the assistive device and the bag.  Leave the ice on for 20 minutes, 2-3 times a day.  Move your fingers or toes often to reduce stiffness and swelling.  Raise (elevate) the injured area above the level of your heart while you are sitting or lying down.  General instructions  Take over-the-counter and prescription medicines only as told by your health care provider.  Maintain a healthy weight. Follow instructions from your health care provider for weight control.  Do not use any products that contain nicotine or tobacco, such as cigarettes, e-cigarettes, and chewing tobacco. If you need help quitting, ask your health care provider.  Use assistive devices as told by your health care provider.  Keep all follow-up visits as told by your health care provider. This is important.  Where to find more information  National [place] of Arthritis and Musculoskeletal and Skin Diseases: www.niams.nih.gov  National [place] on Aging: www.parveen.nih.gov  American College of Rheumatology: www.rheumatology.org  Contact a health care provider if:  You have redness, swelling, or a feeling of warmth in a joint that gets worse.  You have a fever along with joint or muscle aches.  You develop a rash.  You have trouble doing your normal activities.  Get help right away if:  You have pain that gets worse and is not relieved by  pain medicine.  Summary  Osteoarthritis is a type of arthritis that affects tissue covering the ends of bones in joints (cartilage).  This condition is caused by the wearing down of cartilage that covers the ends of bones.  The main symptom of this condition is pain, swelling, and stiffness in the joint.  There is no cure for this condition, but treatment can help control pain and improve joint function.  This information is not intended to replace advice given to you by your health care provider. Make sure you discuss any questions you have with your health care provider.  Document Revised: 12/14/2020 Document Reviewed: 12/14/2020  [employer] Patient Education © 2021 [employer]

## 2022-06-13 NOTE — PROGRESS NOTES
ORTHOPEDIC VISIT    Referring Provider: Tom  Primary Care Provider: Marley Santos APRN         Subjective:  Chief Complaint:  Chief Complaint   Patient presents with   • Left Knee - Initial Evaluation     Pain x 2 months, nki       HPI:  Shelley Black is a 77 y.o. female who presents for initial visit for left knee pain ongoing for the last 2 months.  She denies any specific injury.  She describes both a dull, achy pain, as well as an intermittent sharp, shooting pain, mainly located over the lateral aspect of the knee.  She denies any radiation, numbness, tingling.  She denies any mechanical symptoms, but does report occasional instability.  Her pain does increase with weightbearing.  She is currently on meloxicam, which does not seem to be helping with her discomfort.  She denies any previous history of surgery or injections in the knee.  Referred for consultation by Marley Santos NP.    Past Medical History:   Diagnosis Date   • Depression    • Hyperlipidemia    • Hypertension        Past Surgical History:   Procedure Laterality Date   •  SECTION         Family History   Problem Relation Age of Onset   • Cancer Mother    • Cancer Father    • Heart disease Father        Social History     Occupational History   • Not on file   Tobacco Use   • Smoking status: Former Smoker     Types: Cigarettes   • Smokeless tobacco: Never Used   Vaping Use   • Vaping Use: Never used   Substance and Sexual Activity   • Alcohol use: No   • Drug use: No   • Sexual activity: Defer        Medications:    Current Outpatient Medications:   •  amLODIPine (NORVASC) 2.5 MG tablet, Take 1 tablet by mouth every night at bedtime., Disp: 30 tablet, Rfl: 0  •  atorvastatin (LIPITOR) 40 MG tablet, Take 1 tablet by mouth every night at bedtime., Disp: 90 tablet, Rfl: 1  •  bisoprolol-hydrochlorothiazide (ZIAC) 10-6.25 MG per tablet, Take 1 tablet by mouth Daily., Disp: 90 tablet, Rfl: 1  •  DULoxetine (CYMBALTA) 60 MG  "capsule, Take 1 capsule by mouth Daily., Disp: 90 capsule, Rfl: 1  •  fenofibrate (TRICOR) 145 MG tablet, Take 1 tablet by mouth Daily., Disp: 90 tablet, Rfl: 1  •  Probiotic Product (PROBIOTIC-10 PO), Take 1 tablet by mouth Daily., Disp: , Rfl:   •  diclofenac (VOLTAREN) 75 MG EC tablet, Take 1 tablet by mouth 2 (Two) Times a Day. Prn joint pain, Disp: 60 tablet, Rfl: 2    Allergies:  No Known Allergies      Review of Systems:  Gen -no fever, chills , sweats, headache   Eyes - no irritation or discharge   ENT -  no ear pain , runny nose , sore throat , difficulty swallowing   Resp - no cough , congestion , excessive expectoration   CVS - no chest pain , palpitations.   Abd - no pain , nausea , vomiting , diarrhea   Skin - no rash , lesions.   Neuro - no dizziness    Please see HPI for any other pertinent positives.  All other systems were reviewed and are negative.       Objective   Objective:    /83 (BP Location: Left arm, Patient Position: Sitting, Cuff Size: Large Adult)   Pulse 62   Ht 165.1 cm (65\")   Wt 73.8 kg (162 lb 9.6 oz)   LMP  (LMP Unknown)   BMI 27.06 kg/m²     Physical Examination:  Alert, oriented, overweight individual in no acute distress, ambulating unassisted  Left lower extremity shows no erythema, rashes, or open skin lesions. There is a minimal amount of swelling. It is grossly well aligned, and the patient is neurovascularly intact distally. The knee is stable to varus and valgus stress, there is no patellar maltracking or crepitus noted, and plantar and dorsiflexion is 5/5. There is no tenderness to palpation or with range of motion, which is about 0-130.  Negative Silvana's.           Imaging:  Images were personally reviewed by me today.  DATE OF EXAM:  5/31/2022 9:38 AM     PROCEDURE:  XR KNEE 1 OR 2 VW LEFT-     INDICATIONS:  left knee pain; M25.562-Pain in left knee; G89.29-Other chronic pain     COMPARISON:  No Comparisons Available     TECHNIQUE:      One to two " radiologic views of left knee were obtained.     FINDINGS:  No fracture. Moderate lateral tibiofemoral osteoarthritis, mild  patellofemoral osteoarthritis. Fluid in the suprapatellar bursa      IMPRESSION:  Lateral and patellofemoral compartment osteoarthritis with joint  effusion     Electronically Signed By-Kevon Sherman On:5/31/2022 9:50 AM  This report was finalized on [id number] by  Kevon Sherman, .          Assessment:  1. Primary osteoarthritis of left knee    2. Overweight (BMI 25.0-29.9)                 Plan:  I am recommending treatment with conservative measures at this time.  Weight loss is highly recommended.  I have asked her to stop the meloxicam, we will try diclofenac.  She will be fitted for hinged knee brace today to help with stability and fall prevention.  Intra-articular steroid injection today, risks and benefits were discussed and postinjection instructions were given.  We did also discuss future use with Visco supplementation.  I will plan to see her back in 3 months for recheck.  She should call with any questions or concerns.  Natural history and expected course discussed. Questions answered.  Educational materials distributed.  Rest, ice, compression, and elevation (RICE) therapy.  Quad strengthening exercises.  NSAIDs per medication orders.  OTC analgesics as needed.  Arthrocentesis. See procedure note.  cortisone injections  viscosupplementation  physical therapy  bracing  weight loss  activtiy modification  - Large Joint Arthrocentesis: L knee on 6/13/2022 9:48 AM  Indications: pain  Details: 25 G needle, anterolateral approach  Medications: 2 mL lidocaine 1 %; 80 mg triamcinolone acetonide 40 MG/ML  Outcome: tolerated well, no immediate complications  Procedure, treatment alternatives, risks and benefits explained, specific risks discussed. Consent was given by the patient. Immediately prior to procedure a time out was called to verify the correct patient, procedure, equipment,  "support staff and site/side marked as required. Patient was prepped and draped in the usual sterile fashion.                    ADILIA Ledesma  06/13/22  09:37 EDT    \"Please note that portions of this note were completed with a voice recognition program\".   "

## 2022-07-06 RX ORDER — AMLODIPINE BESYLATE 2.5 MG/1
2.5 TABLET ORAL
Qty: 90 TABLET | Refills: 0 | Status: SHIPPED | OUTPATIENT
Start: 2022-07-06 | End: 2022-09-26

## 2022-09-12 ENCOUNTER — OFFICE VISIT (OUTPATIENT)
Dept: FAMILY MEDICINE CLINIC | Facility: CLINIC | Age: 77
End: 2022-09-12

## 2022-09-12 VITALS
SYSTOLIC BLOOD PRESSURE: 169 MMHG | DIASTOLIC BLOOD PRESSURE: 67 MMHG | TEMPERATURE: 97.6 F | HEIGHT: 65 IN | WEIGHT: 156 LBS | BODY MASS INDEX: 25.99 KG/M2 | HEART RATE: 56 BPM | OXYGEN SATURATION: 100 %

## 2022-09-12 DIAGNOSIS — S62.102A CLOSED FRACTURE OF LEFT WRIST, INITIAL ENCOUNTER: Primary | ICD-10-CM

## 2022-09-12 DIAGNOSIS — Z78.0 POSTMENOPAUSAL: ICD-10-CM

## 2022-09-12 DIAGNOSIS — Z12.31 OTHER SCREENING MAMMOGRAM: ICD-10-CM

## 2022-09-12 DIAGNOSIS — E66.3 OVERWEIGHT (BMI 25.0-29.9): ICD-10-CM

## 2022-09-12 DIAGNOSIS — S69.92XA INJURY OF LEFT WRIST, INITIAL ENCOUNTER: Primary | ICD-10-CM

## 2022-09-12 DIAGNOSIS — Z12.11 COLON CANCER SCREENING: ICD-10-CM

## 2022-09-12 PROCEDURE — 99213 OFFICE O/P EST LOW 20 MIN: CPT | Performed by: NURSE PRACTITIONER

## 2022-09-12 NOTE — PROGRESS NOTES
"    Shelley Black is a 77 y.o. female.     77-year-old white female with hypertension, chronic arthritis pain, psoriasis and had allergic rhinitis who comes in today for left wrist pain after falling.  Patient states her Labrador jumped on her and knocked her down about a week ago and the wrist is swollen and painful.  She has a difficult time hyper extending the wrist.  We will get x-ray today and go from there.    Blood pressure 160/68 heart rate 56 she denies any chest pain, dyspnea, tachycardia or dizziness    Patient has lost 5 pounds with a weight of 157 and a BMI of 26.  She is had 2 COVID vaccines up-to-date on eye exam and I Samra reorder her mammogram DEXA scan and Cologuard          Cologuard  Mammogram/DEXA scan  Follow-up 6 months fasting       The following portions of the patient's history were reviewed and updated as appropriate: allergies, current medications, past family history, past medical history, past social history, past surgical history and problem list.    Vitals:    22 0828   BP: 169/67   BP Location: Right arm   Patient Position: Sitting   Cuff Size: Adult   Pulse: 56   Temp: 97.6 °F (36.4 °C)   TempSrc: Temporal   SpO2: 100%   Weight: 70.8 kg (156 lb)   Height: 165.1 cm (65\")     Body mass index is 25.96 kg/m².    Past Medical History:   Diagnosis Date   • Depression    • Hyperlipidemia    • Hypertension      Past Surgical History:   Procedure Laterality Date   •  SECTION       Family History   Problem Relation Age of Onset   • Cancer Mother    • Cancer Father    • Heart disease Father      Immunization History   Administered Date(s) Administered   • COVID-19 (MODERNA) 1st, 2nd, 3rd Dose Only 2021, 2021   • Flu Vaccine Quad PF 6-35MO 2019   • Fluad Quad 65+ 10/10/2019   • Fluzone High Dose =>65 Years ([facility] ONLY) 2017   • Influenza Quad Vaccine (Inpatient) 2013   • Influenza, Unspecified 2013, 12/10/2015, 10/10/2019   • Pneumococcal " Conjugate 13-Valent (PCV13) 10/10/2019   • Pneumococcal Polysaccharide (PPSV23) 09/12/2013       Office Visit on 05/31/2022   Component Date Value Ref Range Status   • WBC 05/31/2022 7.5  3.4 - 10.8 x10E3/uL Final   • RBC 05/31/2022 5.33 (A) 3.77 - 5.28 x10E6/uL Final   • Hemoglobin 05/31/2022 15.0  11.1 - 15.9 g/dL Final   • Hematocrit 05/31/2022 47.6 (A) 34.0 - 46.6 % Final   • MCV 05/31/2022 89  79 - 97 fL Final   • MCH 05/31/2022 28.1  26.6 - 33.0 pg Final   • MCHC 05/31/2022 31.5  31.5 - 35.7 g/dL Final   • RDW 05/31/2022 13.0  11.7 - 15.4 % Final   • Platelets 05/31/2022 269  150 - 450 x10E3/uL Final   • Neutrophil Rel % 05/31/2022 69  Not Estab. % Final   • Lymphocyte Rel % 05/31/2022 20  Not Estab. % Final   • Monocyte Rel % 05/31/2022 6  Not Estab. % Final   • Eosinophil Rel % 05/31/2022 4  Not Estab. % Final   • Basophil Rel % 05/31/2022 1  Not Estab. % Final   • Neutrophils Absolute 05/31/2022 5.2  1.4 - 7.0 x10E3/uL Final   • Lymphocytes Absolute 05/31/2022 1.5  0.7 - 3.1 x10E3/uL Final   • Monocytes Absolute 05/31/2022 0.4  0.1 - 0.9 x10E3/uL Final   • Eosinophils Absolute 05/31/2022 0.3  0.0 - 0.4 x10E3/uL Final   • Basophils Absolute 05/31/2022 0.1  0.0 - 0.2 x10E3/uL Final   • Immature Granulocyte Rel % 05/31/2022 0  Not Estab. % Final   • Immature Grans Absolute 05/31/2022 0.0  0.0 - 0.1 x10E3/uL Final   • Glucose 05/31/2022 98  65 - 99 mg/dL Final   • BUN 05/31/2022 28 (A) 8 - 27 mg/dL Final   • Creatinine 05/31/2022 0.85  0.57 - 1.00 mg/dL Final   • EGFR Result 05/31/2022 71  >59 mL/min/1.73 Final   • BUN/Creatinine Ratio 05/31/2022 33 (A) 12 - 28 Final   • Sodium 05/31/2022 143  134 - 144 mmol/L Final   • Potassium 05/31/2022 4.7  3.5 - 5.2 mmol/L Final   • Chloride 05/31/2022 103  96 - 106 mmol/L Final   • Total CO2 05/31/2022 23  20 - 29 mmol/L Final   • Calcium 05/31/2022 10.3  8.7 - 10.3 mg/dL Final   • Total Protein 05/31/2022 6.7  6.0 - 8.5 g/dL Final   • Albumin 05/31/2022 4.6  3.7 - 4.7  g/dL Final   • Globulin 05/31/2022 2.1  1.5 - 4.5 g/dL Final   • A/G Ratio 05/31/2022 2.2  1.2 - 2.2 Final   • Total Bilirubin 05/31/2022 0.5  0.0 - 1.2 mg/dL Final   • Alkaline Phosphatase 05/31/2022 88  44 - 121 IU/L Final   • AST (SGOT) 05/31/2022 14  0 - 40 IU/L Final   • ALT (SGPT) 05/31/2022 15  0 - 32 IU/L Final   • Total Cholesterol 05/31/2022 164  100 - 199 mg/dL Final   • Triglycerides 05/31/2022 237 (A) 0 - 149 mg/dL Final   • HDL Cholesterol 05/31/2022 39 (A) >39 mg/dL Final   • VLDL Cholesterol Siva 05/31/2022 40  5 - 40 mg/dL Final   • LDL Chol Calc (NIH) 05/31/2022 85  0 - 99 mg/dL Final   • LDL/HDL RATIO 05/31/2022 2.2  0.0 - 3.2 ratio Final    Comment:                                     LDL/HDL Ratio                                              Men  Women                                1/2 Avg.Risk  1.0    1.5                                    Avg.Risk  3.6    3.2                                 2X Avg.Risk  6.2    5.0                                 3X Avg.Risk  8.0    6.1           Review of Systems    Objective   Physical Exam    Procedures    Assessment & Plan   Diagnoses and all orders for this visit:    1. Injury of left wrist, initial encounter (Primary)  -     XR Wrist 3+ View Left (In Office)    2. Other screening mammogram  -     Mammo Screening Bilateral With CAD; Future    3. Postmenopausal  -     DEXA Bone Density Axial; Future    4. Colon cancer screening  -     Cologuard - Stool, Per Rectum; Future    5. Overweight (BMI 25.0-29.9)          Current Outpatient Medications:   •  amLODIPine (NORVASC) 2.5 MG tablet, TAKE 1 TABLET BY MOUTH EVERY NIGHT AT BEDTIME, Disp: 90 tablet, Rfl: 0  •  atorvastatin (LIPITOR) 40 MG tablet, Take 1 tablet by mouth every night at bedtime., Disp: 90 tablet, Rfl: 1  •  bisoprolol-hydrochlorothiazide (ZIAC) 10-6.25 MG per tablet, Take 1 tablet by mouth Daily., Disp: 90 tablet, Rfl: 1  •  diclofenac (VOLTAREN) 75 MG EC tablet, Take 1 tablet by mouth 2 (Two)  Times a Day. Prn joint pain, Disp: 60 tablet, Rfl: 2  •  DULoxetine (CYMBALTA) 60 MG capsule, Take 1 capsule by mouth Daily., Disp: 90 capsule, Rfl: 1  •  fenofibrate (TRICOR) 145 MG tablet, Take 1 tablet by mouth Daily., Disp: 90 tablet, Rfl: 1  •  Probiotic Product (PROBIOTIC-10 PO), Take 1 tablet by mouth Daily., Disp: , Rfl:            Marley Santos, STEPHANY 9/12/2022 09:13 EDT  This note has been electronically signed

## 2022-09-13 ENCOUNTER — TELEPHONE (OUTPATIENT)
Dept: FAMILY MEDICINE CLINIC | Facility: CLINIC | Age: 77
End: 2022-09-13

## 2022-09-13 NOTE — TELEPHONE ENCOUNTER
Caller: Shelley Black    Relationship: Self    Best call back number: [phone]    Caller requesting test results: SELF    What test was performed: X-RAY ON WRIST    When was the test performed: 9/12    Where was the test performed: IN OFFICE     Additional notes: PLEASE CALL WITH RESULTS

## 2022-09-19 ENCOUNTER — OFFICE VISIT (OUTPATIENT)
Dept: ORTHOPEDIC SURGERY | Facility: CLINIC | Age: 77
End: 2022-09-19

## 2022-09-19 VITALS — HEIGHT: 65 IN | HEART RATE: 53 BPM | WEIGHT: 156 LBS | BODY MASS INDEX: 25.99 KG/M2 | OXYGEN SATURATION: 94 %

## 2022-09-19 DIAGNOSIS — M25.532 LEFT WRIST PAIN: Primary | ICD-10-CM

## 2022-09-19 PROCEDURE — 99214 OFFICE O/P EST MOD 30 MIN: CPT | Performed by: FAMILY MEDICINE

## 2022-09-19 NOTE — PROGRESS NOTES
"Primary Care Sports Medicine Office Visit Note     Patient ID: Shelley Black is a 77 y.o. female.    Chief Complaint:  Chief Complaint   Patient presents with   • Left Wrist - Pain     HPI:    Ms. Shelley Black is a 77 y.o. female. The patient presents to the clinic today for a left wrist fracture.    The patient reports she was knocked over by her dog approximately 2 weeks ago and landed on her outstretched left hand. She states she has been experiencing pain and swelling since the incident. She reports she had an x-ray done in [place] and was told she had a fracture. The patient states she has been elevating and icing her left wrist. She notes she has been taking Advil for pain.    The patient states she has been seen by Dr. Vance for her knee.    The patient is currently taking cholesterol medication, blood pressure medication, Norvasc, Lipitor, Tricor, hydrochlorothiazide, diclofenac,  bisoprolol, and Cymbalta.    Past Medical History:   Diagnosis Date   • Depression    • Hyperlipidemia    • Hypertension        Past Surgical History:   Procedure Laterality Date   •  SECTION         Family History   Problem Relation Age of Onset   • Cancer Mother    • Cancer Father    • Heart disease Father      Social History     Occupational History   • Not on file   Tobacco Use   • Smoking status: Former Smoker     Types: Cigarettes   • Smokeless tobacco: Never Used   Vaping Use   • Vaping Use: Never used   Substance and Sexual Activity   • Alcohol use: No   • Drug use: No   • Sexual activity: Defer      Review of Systems   Constitutional: Negative for activity change and fever.   Musculoskeletal: Positive for arthralgias.   Skin: Negative for color change and rash.   Neurological: Negative for weakness.     Objective:    Pulse 53   Ht 165.1 cm (65\")   Wt 70.8 kg (156 lb)   LMP  (LMP Unknown)   SpO2 94%   BMI 25.96 kg/m²     Physical Examination:  Physical Exam  Vitals and nursing note reviewed.   Constitutional: "       General: She is not in acute distress.     Appearance: She is well-developed. She is not diaphoretic.   HENT:      Head: Normocephalic and atraumatic.   Eyes:      Conjunctiva/sclera: Conjunctivae normal.   Pulmonary:      Effort: Pulmonary effort is normal. No respiratory distress.   Skin:     General: Skin is warm.      Capillary Refill: Capillary refill takes less than 2 seconds.   Neurological:      Mental Status: She is alert.       Left Hand Exam     Tenderness   The patient is experiencing tenderness in the radial area.     Comments:  Left wrist examination yields moderate swelling with considerable tenderness to palpation of the distal radius. There is a mild limitation of extension to about 30 degrees. Radial, ulnar deviation, and flexion are preserved, however, there is moderate instability of the distal radial ulnar joint.        Imaging and other tests:  Three-view XR left wrist dated 9/12/2022 yields displaced and impacted intra-articular fracture involving the distal radial metaphysis.    Assessment and Plan:    1. Blanc's fracture of the left distal radius  - Ambulatory Referral to Hand Surgery    - I discussed pathology and treatment options with the patient. With the complexity of this fracture, she will likely need surgical repair. I will send her to Dr. Carpio and [facility] for further evaluation and surgical intervention as they see fit.    Transcribed from ambient dictation for Jordan Guevara II,  by SUKUMAR SCHMIDT.  09/19/22   14:46 EDT    Patient verbalized consent to the visit recording.    Disclaimer: Please note that areas of this note were completed with computer voice recognition software.  Quite often unanticipated grammatical, syntax, homophones, and other interpretive errors are inadvertently transcribed by the computer software. Please excuse any errors that have escaped final proofreading.

## 2022-09-26 RX ORDER — AMLODIPINE BESYLATE 2.5 MG/1
2.5 TABLET ORAL
Qty: 90 TABLET | Refills: 0 | Status: SHIPPED | OUTPATIENT
Start: 2022-09-26 | End: 2022-11-30 | Stop reason: SDUPTHER

## 2022-09-29 DIAGNOSIS — R92.8 ABNORMAL MAMMOGRAM: Primary | ICD-10-CM

## 2022-09-29 DIAGNOSIS — Z12.31 OTHER SCREENING MAMMOGRAM: ICD-10-CM

## 2022-09-29 DIAGNOSIS — Z78.0 POSTMENOPAUSAL: ICD-10-CM

## 2022-09-30 ENCOUNTER — TELEPHONE (OUTPATIENT)
Dept: FAMILY MEDICINE CLINIC | Facility: CLINIC | Age: 77
End: 2022-09-30

## 2022-10-27 DIAGNOSIS — R92.8 ABNORMAL MAMMOGRAM: ICD-10-CM

## 2022-11-07 DIAGNOSIS — M17.12 PRIMARY OSTEOARTHRITIS OF LEFT KNEE: ICD-10-CM

## 2022-11-07 RX ORDER — DICLOFENAC SODIUM 75 MG/1
TABLET, DELAYED RELEASE ORAL
Qty: 60 TABLET | Refills: 11 | Status: SHIPPED | OUTPATIENT
Start: 2022-11-07

## 2022-11-09 RX ORDER — FENOFIBRATE 145 MG/1
145 TABLET, COATED ORAL DAILY
Qty: 90 TABLET | Refills: 1 | Status: SHIPPED | OUTPATIENT
Start: 2022-11-09 | End: 2022-11-30 | Stop reason: SDUPTHER

## 2022-11-28 RX ORDER — BISOPROLOL FUMARATE AND HYDROCHLOROTHIAZIDE 10; 6.25 MG/1; MG/1
TABLET ORAL
Qty: 90 TABLET | Refills: 1 | Status: SHIPPED | OUTPATIENT
Start: 2022-11-28 | End: 2022-11-30 | Stop reason: SDUPTHER

## 2022-11-28 RX ORDER — ATORVASTATIN CALCIUM 40 MG/1
TABLET, FILM COATED ORAL
Qty: 90 TABLET | Refills: 1 | Status: SHIPPED | OUTPATIENT
Start: 2022-11-28 | End: 2022-11-30 | Stop reason: SDUPTHER

## 2022-11-30 ENCOUNTER — OFFICE VISIT (OUTPATIENT)
Dept: FAMILY MEDICINE CLINIC | Facility: CLINIC | Age: 77
End: 2022-11-30

## 2022-11-30 VITALS
SYSTOLIC BLOOD PRESSURE: 138 MMHG | BODY MASS INDEX: 25.62 KG/M2 | DIASTOLIC BLOOD PRESSURE: 74 MMHG | HEART RATE: 99 BPM | TEMPERATURE: 96.9 F | HEIGHT: 65 IN | OXYGEN SATURATION: 99 % | WEIGHT: 153.8 LBS

## 2022-11-30 DIAGNOSIS — E78.1 HIGH TRIGLYCERIDES: Primary | ICD-10-CM

## 2022-11-30 DIAGNOSIS — E66.3 OVERWEIGHT (BMI 25.0-29.9): ICD-10-CM

## 2022-11-30 DIAGNOSIS — Z12.11 COLON CANCER SCREENING: ICD-10-CM

## 2022-11-30 DIAGNOSIS — Z00.00 PREVENTATIVE HEALTH CARE: ICD-10-CM

## 2022-11-30 PROBLEM — S62.102S LEFT WRIST FRACTURE, SEQUELA: Status: ACTIVE | Noted: 2022-11-30

## 2022-11-30 PROCEDURE — 99213 OFFICE O/P EST LOW 20 MIN: CPT | Performed by: NURSE PRACTITIONER

## 2022-11-30 RX ORDER — BISOPROLOL FUMARATE AND HYDROCHLOROTHIAZIDE 10; 6.25 MG/1; MG/1
1 TABLET ORAL DAILY
Qty: 90 TABLET | Refills: 1 | Status: SHIPPED | OUTPATIENT
Start: 2022-11-30 | End: 2023-02-15

## 2022-11-30 RX ORDER — ATORVASTATIN CALCIUM 40 MG/1
40 TABLET, FILM COATED ORAL
Qty: 90 TABLET | Refills: 1 | Status: SHIPPED | OUTPATIENT
Start: 2022-11-30 | End: 2023-02-15

## 2022-11-30 RX ORDER — DULOXETIN HYDROCHLORIDE 60 MG/1
60 CAPSULE, DELAYED RELEASE ORAL DAILY
Qty: 90 CAPSULE | Refills: 1 | Status: SHIPPED | OUTPATIENT
Start: 2022-11-30 | End: 2023-02-15

## 2022-11-30 RX ORDER — AMLODIPINE BESYLATE 2.5 MG/1
2.5 TABLET ORAL
Qty: 90 TABLET | Refills: 1 | Status: SHIPPED | OUTPATIENT
Start: 2022-11-30

## 2022-11-30 RX ORDER — FENOFIBRATE 145 MG/1
145 TABLET, COATED ORAL DAILY
Qty: 90 TABLET | Refills: 1 | Status: SHIPPED | OUTPATIENT
Start: 2022-11-30 | End: 2023-02-13 | Stop reason: SDUPTHER

## 2022-11-30 NOTE — PROGRESS NOTES
"    Shelley Black is a 77 y.o. female.     History of Present Illness  77-year-old white female with hypertension, chronic arthritis pain, psoriasis and allergic rhinitis who comes in today for 6-month follow-up visit and fasting blood work    Blood pressure 138/74 heart rate 98 she denies any chest pain, dyspnea, tachycardia or dizziness    When I saw patient in September she had tripped over her dog at home falling on her left wrist.  Turned out she had fractured bones in her wrist and up having surgery with a hand specialist and is currently in physical therapy.  They expect her to have full range of motion    When he is down 3 pounds at 154 with a BMI of 25.6.  She is up-to-date on flu shot COVID vaccines eye exam mammogram DEXA scan and she did send in the Cologuard but they stated they never got it so I will order her another 1.            Cologuard  Fasting blood work  Keep all appointments with physical therapy  Follow-up 6 months       The following portions of the patient's history were reviewed and updated as appropriate: allergies, current medications, past family history, past medical history, past social history, past surgical history and problem list.    Vitals:    22 0917   BP: 138/74   BP Location: Left arm   Patient Position: Sitting   Cuff Size: Adult   Pulse: 99   Temp: 96.9 °F (36.1 °C)   TempSrc: Infrared   SpO2: 99%   Weight: 69.8 kg (153 lb 12.8 oz)   Height: 165.1 cm (65\")     Body mass index is 25.59 kg/m².    Past Medical History:   Diagnosis Date   • Depression    • Hyperlipidemia    • Hypertension      Past Surgical History:   Procedure Laterality Date   •  SECTION       Family History   Problem Relation Age of Onset   • Cancer Mother    • Cancer Father    • Heart disease Father      Immunization History   Administered Date(s) Administered   • COVID-19 (MODERNA) 1st, 2nd, 3rd Dose Only 2021, 2021   • COVID-19 (UNSPECIFIED) 2021, 2021, 2021 "   • FLUAD TRI 65YR+ 10/10/2019   • Flu Vaccine Quad PF 6-35MO 01/16/2019   • Fluad Quad 65+ 10/10/2019, 11/11/2022   • Fluzone High Dose =>65 Years ([facility] ONLY) 11/16/2017   • Influenza Quad Vaccine (Inpatient) 01/11/2013   • Influenza, Unspecified 09/12/2013, 12/10/2015, 10/10/2019   • Pneumococcal Conjugate 13-Valent (PCV13) 10/10/2019   • Pneumococcal Polysaccharide (PPSV23) 09/12/2013       Office Visit on 05/31/2022   Component Date Value Ref Range Status   • WBC 05/31/2022 7.5  3.4 - 10.8 x10E3/uL Final   • RBC 05/31/2022 5.33 (H)  3.77 - 5.28 x10E6/uL Final   • Hemoglobin 05/31/2022 15.0  11.1 - 15.9 g/dL Final   • Hematocrit 05/31/2022 47.6 (H)  34.0 - 46.6 % Final   • MCV 05/31/2022 89  79 - 97 fL Final   • MCH 05/31/2022 28.1  26.6 - 33.0 pg Final   • MCHC 05/31/2022 31.5  31.5 - 35.7 g/dL Final   • RDW 05/31/2022 13.0  11.7 - 15.4 % Final   • Platelets 05/31/2022 269  150 - 450 x10E3/uL Final   • Neutrophil Rel % 05/31/2022 69  Not Estab. % Final   • Lymphocyte Rel % 05/31/2022 20  Not Estab. % Final   • Monocyte Rel % 05/31/2022 6  Not Estab. % Final   • Eosinophil Rel % 05/31/2022 4  Not Estab. % Final   • Basophil Rel % 05/31/2022 1  Not Estab. % Final   • Neutrophils Absolute 05/31/2022 5.2  1.4 - 7.0 x10E3/uL Final   • Lymphocytes Absolute 05/31/2022 1.5  0.7 - 3.1 x10E3/uL Final   • Monocytes Absolute 05/31/2022 0.4  0.1 - 0.9 x10E3/uL Final   • Eosinophils Absolute 05/31/2022 0.3  0.0 - 0.4 x10E3/uL Final   • Basophils Absolute 05/31/2022 0.1  0.0 - 0.2 x10E3/uL Final   • Immature Granulocyte Rel % 05/31/2022 0  Not Estab. % Final   • Immature Grans Absolute 05/31/2022 0.0  0.0 - 0.1 x10E3/uL Final   • Glucose 05/31/2022 98  65 - 99 mg/dL Final   • BUN 05/31/2022 28 (H)  8 - 27 mg/dL Final   • Creatinine 05/31/2022 0.85  0.57 - 1.00 mg/dL Final   • EGFR Result 05/31/2022 71  >59 mL/min/1.73 Final   • BUN/Creatinine Ratio 05/31/2022 33 (H)  12 - 28 Final   • Sodium 05/31/2022 143  134 - 144 mmol/L  Final   • Potassium 05/31/2022 4.7  3.5 - 5.2 mmol/L Final   • Chloride 05/31/2022 103  96 - 106 mmol/L Final   • Total CO2 05/31/2022 23  20 - 29 mmol/L Final   • Calcium 05/31/2022 10.3  8.7 - 10.3 mg/dL Final   • Total Protein 05/31/2022 6.7  6.0 - 8.5 g/dL Final   • Albumin 05/31/2022 4.6  3.7 - 4.7 g/dL Final   • Globulin 05/31/2022 2.1  1.5 - 4.5 g/dL Final   • A/G Ratio 05/31/2022 2.2  1.2 - 2.2 Final   • Total Bilirubin 05/31/2022 0.5  0.0 - 1.2 mg/dL Final   • Alkaline Phosphatase 05/31/2022 88  44 - 121 IU/L Final   • AST (SGOT) 05/31/2022 14  0 - 40 IU/L Final   • ALT (SGPT) 05/31/2022 15  0 - 32 IU/L Final   • Total Cholesterol 05/31/2022 164  100 - 199 mg/dL Final   • Triglycerides 05/31/2022 237 (H)  0 - 149 mg/dL Final   • HDL Cholesterol 05/31/2022 39 (L)  >39 mg/dL Final   • VLDL Cholesterol Siva 05/31/2022 40  5 - 40 mg/dL Final   • LDL Chol Calc (NIH) 05/31/2022 85  0 - 99 mg/dL Final   • LDL/HDL RATIO 05/31/2022 2.2  0.0 - 3.2 ratio Final    Comment:                                     LDL/HDL Ratio                                              Men  Women                                1/2 Avg.Risk  1.0    1.5                                    Avg.Risk  3.6    3.2                                 2X Avg.Risk  6.2    5.0                                 3X Avg.Risk  8.0    6.1           Review of Systems   Constitutional: Negative.    HENT: Negative.    Respiratory: Negative.    Cardiovascular: Negative.    Gastrointestinal: Negative.    Genitourinary: Negative.    Musculoskeletal:        Left wrist pain   Skin: Negative.    Neurological: Negative.    Psychiatric/Behavioral: Negative.        Objective   Physical Exam    Procedures    Assessment & Plan   Diagnoses and all orders for this visit:    1. High triglycerides (Primary)  -     Lipid Panel With LDL / HDL Ratio    2. Preventative health care  -     CBC & Differential  -     Comprehensive Metabolic Panel    3. Overweight (BMI 25.0-29.9)    4.  Colon cancer screening  -     Cologuard - Stool, Per Rectum; Future    Other orders  -     amLODIPine (NORVASC) 2.5 MG tablet; Take 1 tablet by mouth every night at bedtime.  Dispense: 90 tablet; Refill: 1  -     fenofibrate (TRICOR) 145 MG tablet; Take 1 tablet by mouth Daily.  Dispense: 90 tablet; Refill: 1  -     DULoxetine (CYMBALTA) 60 MG capsule; Take 1 capsule by mouth Daily.  Dispense: 90 capsule; Refill: 1  -     bisoprolol-hydrochlorothiazide (ZIAC) 10-6.25 MG per tablet; Take 1 tablet by mouth Daily.  Dispense: 90 tablet; Refill: 1  -     atorvastatin (LIPITOR) 40 MG tablet; Take 1 tablet by mouth every night at bedtime.  Dispense: 90 tablet; Refill: 1          Current Outpatient Medications:   •  amLODIPine (NORVASC) 2.5 MG tablet, Take 1 tablet by mouth every night at bedtime., Disp: 90 tablet, Rfl: 1  •  atorvastatin (LIPITOR) 40 MG tablet, Take 1 tablet by mouth every night at bedtime., Disp: 90 tablet, Rfl: 1  •  bisoprolol-hydrochlorothiazide (ZIAC) 10-6.25 MG per tablet, Take 1 tablet by mouth Daily., Disp: 90 tablet, Rfl: 1  •  diclofenac (VOLTAREN) 75 MG EC tablet, TAKE 1 TABLET BY MOUTH TWICE DAILY AS NEEDED FOR JOINT PAIN, Disp: 60 tablet, Rfl: 11  •  DULoxetine (CYMBALTA) 60 MG capsule, Take 1 capsule by mouth Daily., Disp: 90 capsule, Rfl: 1  •  fenofibrate (TRICOR) 145 MG tablet, Take 1 tablet by mouth Daily., Disp: 90 tablet, Rfl: 1  •  Probiotic Product (PROBIOTIC-10 PO), Take 1 tablet by mouth Daily., Disp: , Rfl:            Marley Santos, APRN 11/30/2022 09:32 EST  This note has been electronically signed

## 2022-12-01 LAB
ALBUMIN SERPL-MCNC: 4.8 G/DL (ref 3.7–4.7)
ALBUMIN/GLOB SERPL: 2.1 {RATIO} (ref 1.2–2.2)
ALP SERPL-CCNC: 98 IU/L (ref 44–121)
ALT SERPL-CCNC: 15 IU/L (ref 0–32)
AST SERPL-CCNC: 16 IU/L (ref 0–40)
BASOPHILS # BLD AUTO: 0.1 X10E3/UL (ref 0–0.2)
BASOPHILS NFR BLD AUTO: 1 %
BILIRUB SERPL-MCNC: 0.6 MG/DL (ref 0–1.2)
BUN SERPL-MCNC: 27 MG/DL (ref 8–27)
BUN/CREAT SERPL: 38 (ref 12–28)
CALCIUM SERPL-MCNC: 10 MG/DL (ref 8.7–10.3)
CHLORIDE SERPL-SCNC: 103 MMOL/L (ref 96–106)
CHOLEST SERPL-MCNC: 159 MG/DL (ref 100–199)
CO2 SERPL-SCNC: 25 MMOL/L (ref 20–29)
CREAT SERPL-MCNC: 0.71 MG/DL (ref 0.57–1)
EGFRCR SERPLBLD CKD-EPI 2021: 89 ML/MIN/1.73
EOSINOPHIL # BLD AUTO: 0.2 X10E3/UL (ref 0–0.4)
EOSINOPHIL NFR BLD AUTO: 2 %
ERYTHROCYTE [DISTWIDTH] IN BLOOD BY AUTOMATED COUNT: 13 % (ref 11.7–15.4)
GLOBULIN SER CALC-MCNC: 2.3 G/DL (ref 1.5–4.5)
GLUCOSE SERPL-MCNC: 91 MG/DL (ref 70–99)
HCT VFR BLD AUTO: 45.8 % (ref 34–46.6)
HDLC SERPL-MCNC: 43 MG/DL
HGB BLD-MCNC: 14.7 G/DL (ref 11.1–15.9)
IMM GRANULOCYTES # BLD AUTO: 0 X10E3/UL (ref 0–0.1)
IMM GRANULOCYTES NFR BLD AUTO: 0 %
LDLC SERPL CALC-MCNC: 78 MG/DL (ref 0–99)
LDLC/HDLC SERPL: 1.8 RATIO (ref 0–3.2)
LYMPHOCYTES # BLD AUTO: 1.7 X10E3/UL (ref 0.7–3.1)
LYMPHOCYTES NFR BLD AUTO: 17 %
MCH RBC QN AUTO: 28.3 PG (ref 26.6–33)
MCHC RBC AUTO-ENTMCNC: 32.1 G/DL (ref 31.5–35.7)
MCV RBC AUTO: 88 FL (ref 79–97)
MONOCYTES # BLD AUTO: 0.5 X10E3/UL (ref 0.1–0.9)
MONOCYTES NFR BLD AUTO: 5 %
NEUTROPHILS # BLD AUTO: 7.8 X10E3/UL (ref 1.4–7)
NEUTROPHILS NFR BLD AUTO: 75 %
PLATELET # BLD AUTO: 255 X10E3/UL (ref 150–450)
POTASSIUM SERPL-SCNC: 3.8 MMOL/L (ref 3.5–5.2)
PROT SERPL-MCNC: 7.1 G/DL (ref 6–8.5)
RBC # BLD AUTO: 5.2 X10E6/UL (ref 3.77–5.28)
SODIUM SERPL-SCNC: 143 MMOL/L (ref 134–144)
TRIGL SERPL-MCNC: 231 MG/DL (ref 0–149)
VLDLC SERPL CALC-MCNC: 38 MG/DL (ref 5–40)
WBC # BLD AUTO: 10.3 X10E3/UL (ref 3.4–10.8)

## 2022-12-07 RX ORDER — AMLODIPINE BESYLATE 2.5 MG/1
2.5 TABLET ORAL
Qty: 90 TABLET | Refills: 1 | OUTPATIENT
Start: 2022-12-07

## 2023-01-24 ENCOUNTER — OFFICE VISIT (OUTPATIENT)
Dept: FAMILY MEDICINE CLINIC | Facility: CLINIC | Age: 78
End: 2023-01-24
Payer: MEDICARE

## 2023-01-24 VITALS
OXYGEN SATURATION: 97 % | BODY MASS INDEX: 26.06 KG/M2 | DIASTOLIC BLOOD PRESSURE: 100 MMHG | HEIGHT: 65 IN | TEMPERATURE: 97.6 F | SYSTOLIC BLOOD PRESSURE: 210 MMHG | WEIGHT: 156.4 LBS | HEART RATE: 59 BPM

## 2023-01-24 DIAGNOSIS — Z00.00 PREVENTATIVE HEALTH CARE: ICD-10-CM

## 2023-01-24 DIAGNOSIS — I10 PRIMARY HYPERTENSION: ICD-10-CM

## 2023-01-24 DIAGNOSIS — E78.2 MIXED HYPERLIPIDEMIA: Primary | ICD-10-CM

## 2023-01-24 PROCEDURE — 1159F MED LIST DOCD IN RCRD: CPT | Performed by: NURSE PRACTITIONER

## 2023-01-24 PROCEDURE — 96160 PT-FOCUSED HLTH RISK ASSMT: CPT | Performed by: NURSE PRACTITIONER

## 2023-01-24 PROCEDURE — G0439 PPPS, SUBSEQ VISIT: HCPCS | Performed by: NURSE PRACTITIONER

## 2023-01-24 PROCEDURE — 1170F FXNL STATUS ASSESSED: CPT | Performed by: NURSE PRACTITIONER

## 2023-01-24 NOTE — PATIENT INSTRUCTIONS
Fasting blood work  Take blood pressure medication when you get home  Monitor blood pressure with parameters given make sure they stay within parameters  When you come back for blood work bring your blood pressure cuff to be checked  Follow-up 6 months

## 2023-01-24 NOTE — PROGRESS NOTES
"    Shelley Black is a 77 y.o. female.     History of Present Illness  77-year-old white female with hypertension, chronic arthritis pain, psoriasis and allergic rhinitis who comes in today for Medicare wellness visit    Blood pressure 210/100 heart rate 60 she denies any chest pain, dyspnea, tachycardia or dizziness.  Unfortunately patient cannot tell when her blood pressure is high.  She has just been to the dentist and did not take her medications this morning which probably accounts for her hypertension.  She does monitor her pressures at home and they are normally within normal limits.  She is going to go home take her medication and recheck a couple hours to make sure it goes back to normal    Weight is 156 with BMI 26.  She is up-to-date on COVID vaccines eye exam mammogram DEXA scan and next Cologuard is due in 2024            Fasting blood work  Take blood pressure medication when you get home  Monitor blood pressure with parameters given make sure they stay within parameters  When you come back for blood work bring your blood pressure cuff to be checked  Follow-up 6 months       The following portions of the patient's history were reviewed and updated as appropriate: allergies, current medications, past family history, past medical history, past social history, past surgical history and problem list.    Vitals:    23 1009   BP: (!) 210/100   BP Location: Left arm   Patient Position: Sitting   Cuff Size: Adult   Pulse: 59   Temp: 97.6 °F (36.4 °C)   TempSrc: Temporal   SpO2: 97%   Weight: 70.9 kg (156 lb 6.4 oz)   Height: 165.1 cm (65\")     Body mass index is 26.03 kg/m².    Past Medical History:   Diagnosis Date   • Depression    • Hyperlipidemia    • Hypertension      Past Surgical History:   Procedure Laterality Date   •  SECTION       Family History   Problem Relation Age of Onset   • Cancer Mother    • Cancer Father    • Heart disease Father      Immunization History "   Administered Date(s) Administered   • COVID-19 (MODERNA) 1st, 2nd, 3rd Dose Only 02/19/2021, 03/19/2021   • COVID-19 (UNSPECIFIED) 02/19/2021, 03/19/2021, 11/22/2021   • FLUAD TRI 65YR+ 10/10/2019   • Flu Vaccine Quad PF 6-35MO 01/16/2019   • Fluad Quad 65+ 10/10/2019, 11/11/2022   • Fluzone High Dose =>65 Years ([facility] ONLY) 11/16/2017   • Influenza Quad Vaccine (Inpatient) 01/11/2013   • Influenza, Unspecified 09/12/2013, 12/10/2015, 10/10/2019   • Pneumococcal Conjugate 13-Valent (PCV13) 10/10/2019   • Pneumococcal Polysaccharide (PPSV23) 09/12/2013       Office Visit on 11/30/2022   Component Date Value Ref Range Status   • WBC 11/30/2022 10.3  3.4 - 10.8 x10E3/uL Final   • RBC 11/30/2022 5.20  3.77 - 5.28 x10E6/uL Final   • Hemoglobin 11/30/2022 14.7  11.1 - 15.9 g/dL Final   • Hematocrit 11/30/2022 45.8  34.0 - 46.6 % Final   • MCV 11/30/2022 88  79 - 97 fL Final   • MCH 11/30/2022 28.3  26.6 - 33.0 pg Final   • MCHC 11/30/2022 32.1  31.5 - 35.7 g/dL Final   • RDW 11/30/2022 13.0  11.7 - 15.4 % Final   • Platelets 11/30/2022 255  150 - 450 x10E3/uL Final   • Neutrophil Rel % 11/30/2022 75  Not Estab. % Final   • Lymphocyte Rel % 11/30/2022 17  Not Estab. % Final   • Monocyte Rel % 11/30/2022 5  Not Estab. % Final   • Eosinophil Rel % 11/30/2022 2  Not Estab. % Final   • Basophil Rel % 11/30/2022 1  Not Estab. % Final   • Neutrophils Absolute 11/30/2022 7.8 (H)  1.4 - 7.0 x10E3/uL Final   • Lymphocytes Absolute 11/30/2022 1.7  0.7 - 3.1 x10E3/uL Final   • Monocytes Absolute 11/30/2022 0.5  0.1 - 0.9 x10E3/uL Final   • Eosinophils Absolute 11/30/2022 0.2  0.0 - 0.4 x10E3/uL Final   • Basophils Absolute 11/30/2022 0.1  0.0 - 0.2 x10E3/uL Final   • Immature Granulocyte Rel % 11/30/2022 0  Not Estab. % Final   • Immature Grans Absolute 11/30/2022 0.0  0.0 - 0.1 x10E3/uL Final   • Glucose 11/30/2022 91  70 - 99 mg/dL Final   • BUN 11/30/2022 27  8 - 27 mg/dL Final   • Creatinine 11/30/2022 0.71  0.57 - 1.00 mg/dL  Final   • EGFR Result 11/30/2022 89  >59 mL/min/1.73 Final   • BUN/Creatinine Ratio 11/30/2022 38 (H)  12 - 28 Final   • Sodium 11/30/2022 143  134 - 144 mmol/L Final   • Potassium 11/30/2022 3.8  3.5 - 5.2 mmol/L Final   • Chloride 11/30/2022 103  96 - 106 mmol/L Final   • Total CO2 11/30/2022 25  20 - 29 mmol/L Final   • Calcium 11/30/2022 10.0  8.7 - 10.3 mg/dL Final   • Total Protein 11/30/2022 7.1  6.0 - 8.5 g/dL Final   • Albumin 11/30/2022 4.8 (H)  3.7 - 4.7 g/dL Final   • Globulin 11/30/2022 2.3  1.5 - 4.5 g/dL Final   • A/G Ratio 11/30/2022 2.1  1.2 - 2.2 Final   • Total Bilirubin 11/30/2022 0.6  0.0 - 1.2 mg/dL Final   • Alkaline Phosphatase 11/30/2022 98  44 - 121 IU/L Final   • AST (SGOT) 11/30/2022 16  0 - 40 IU/L Final   • ALT (SGPT) 11/30/2022 15  0 - 32 IU/L Final   • Total Cholesterol 11/30/2022 159  100 - 199 mg/dL Final   • Triglycerides 11/30/2022 231 (H)  0 - 149 mg/dL Final   • HDL Cholesterol 11/30/2022 43  >39 mg/dL Final   • VLDL Cholesterol Siva 11/30/2022 38  5 - 40 mg/dL Final   • LDL Chol Calc ([facility]) 11/30/2022 78  0 - 99 mg/dL Final   • LDL/HDL RATIO 11/30/2022 1.8  0.0 - 3.2 ratio Final    Comment:                                     LDL/HDL Ratio                                              Men  Women                                1/2 Avg.Risk  1.0    1.5                                    Avg.Risk  3.6    3.2                                 2X Avg.Risk  6.2    5.0                                 3X Avg.Risk  8.0    6.1           Review of Systems   Constitutional: Negative.    HENT: Negative.    Respiratory: Negative.    Cardiovascular: Negative.    Gastrointestinal: Negative.    Genitourinary: Negative.    Musculoskeletal: Negative.    Skin: Negative.    Psychiatric/Behavioral: Negative.        Objective   Physical Exam  Constitutional:       Appearance: Normal appearance.   HENT:      Head: Normocephalic.   Cardiovascular:      Rate and Rhythm: Normal rate and regular rhythm.       Pulses: Normal pulses.      Heart sounds: Normal heart sounds.   Pulmonary:      Effort: Pulmonary effort is normal.   Abdominal:      General: Bowel sounds are normal.   Musculoskeletal:         General: Normal range of motion.   Skin:     General: Skin is warm and dry.   Neurological:      General: No focal deficit present.      Mental Status: She is alert and oriented to person, place, and time.   Psychiatric:         Mood and Affect: Mood normal.         Behavior: Behavior normal.         Procedures    Assessment & Plan   Diagnoses and all orders for this visit:    1. Mixed hyperlipidemia (Primary)  -     Lipid Panel With LDL / HDL Ratio; Future    2. Preventative health care  -     CBC & Differential; Future  -     Comprehensive Metabolic Panel; Future  -     Hemoglobin A1c; Future    3. Primary hypertension          Current Outpatient Medications:   •  amLODIPine (NORVASC) 2.5 MG tablet, Take 1 tablet by mouth every night at bedtime., Disp: 90 tablet, Rfl: 1  •  atorvastatin (LIPITOR) 40 MG tablet, Take 1 tablet by mouth every night at bedtime., Disp: 90 tablet, Rfl: 1  •  bisoprolol-hydrochlorothiazide (ZIAC) 10-6.25 MG per tablet, Take 1 tablet by mouth Daily., Disp: 90 tablet, Rfl: 1  •  diclofenac (VOLTAREN) 75 MG EC tablet, TAKE 1 TABLET BY MOUTH TWICE DAILY AS NEEDED FOR JOINT PAIN, Disp: 60 tablet, Rfl: 11  •  DULoxetine (CYMBALTA) 60 MG capsule, Take 1 capsule by mouth Daily., Disp: 90 capsule, Rfl: 1  •  fenofibrate (TRICOR) 145 MG tablet, Take 1 tablet by mouth Daily., Disp: 90 tablet, Rfl: 1  •  Probiotic Product (PROBIOTIC-10 PO), Take 1 tablet by mouth Daily., Disp: , Rfl:            Marley Santos, STEPHANY 1/24/2023 10:35 EST  This note has been electronically signed

## 2023-01-30 ENCOUNTER — CLINICAL SUPPORT (OUTPATIENT)
Dept: FAMILY MEDICINE CLINIC | Facility: CLINIC | Age: 78
End: 2023-01-30
Payer: MEDICARE

## 2023-01-30 VITALS — HEART RATE: 55 BPM | DIASTOLIC BLOOD PRESSURE: 90 MMHG | SYSTOLIC BLOOD PRESSURE: 160 MMHG

## 2023-01-30 DIAGNOSIS — I10 PRIMARY HYPERTENSION: ICD-10-CM

## 2023-02-13 RX ORDER — FENOFIBRATE 145 MG/1
145 TABLET, COATED ORAL DAILY
Qty: 90 TABLET | Refills: 1 | Status: SHIPPED | OUTPATIENT
Start: 2023-02-13

## 2023-02-15 RX ORDER — BISOPROLOL FUMARATE AND HYDROCHLOROTHIAZIDE 10; 6.25 MG/1; MG/1
TABLET ORAL
Qty: 90 TABLET | Refills: 1 | Status: SHIPPED | OUTPATIENT
Start: 2023-02-15

## 2023-02-15 RX ORDER — DULOXETIN HYDROCHLORIDE 60 MG/1
CAPSULE, DELAYED RELEASE ORAL
Qty: 90 CAPSULE | Refills: 1 | Status: SHIPPED | OUTPATIENT
Start: 2023-02-15

## 2023-02-15 RX ORDER — ATORVASTATIN CALCIUM 40 MG/1
TABLET, FILM COATED ORAL
Qty: 90 TABLET | Refills: 1 | Status: SHIPPED | OUTPATIENT
Start: 2023-02-15

## 2023-05-15 RX ORDER — AMLODIPINE BESYLATE 2.5 MG/1
TABLET ORAL
Qty: 90 TABLET | Refills: 1 | Status: SHIPPED | OUTPATIENT
Start: 2023-05-15

## 2023-05-22 ENCOUNTER — TELEPHONE (OUTPATIENT)
Dept: FAMILY MEDICINE CLINIC | Facility: CLINIC | Age: 78
End: 2023-05-22
Payer: MEDICARE

## 2023-05-22 NOTE — TELEPHONE ENCOUNTER
Caller: Shelley Black    Best call back number: [phone]    What orders are you requesting (i.e. lab or imaging): LABS    In what timeframe would the patient need to come in: BEFORE APPOINTMENT ON 5/30/2023    Additional notes: PLEASE CALL TO SCHEDULE WHEN LAB ORDERS ARE PLACED.

## 2023-05-30 ENCOUNTER — OFFICE VISIT (OUTPATIENT)
Dept: FAMILY MEDICINE CLINIC | Facility: CLINIC | Age: 78
End: 2023-05-30

## 2023-05-30 VITALS
HEIGHT: 65 IN | BODY MASS INDEX: 26.59 KG/M2 | SYSTOLIC BLOOD PRESSURE: 151 MMHG | WEIGHT: 159.6 LBS | TEMPERATURE: 97.1 F | HEART RATE: 53 BPM | DIASTOLIC BLOOD PRESSURE: 92 MMHG | OXYGEN SATURATION: 93 %

## 2023-05-30 DIAGNOSIS — E66.3 OVERWEIGHT (BMI 25.0-29.9): ICD-10-CM

## 2023-05-30 DIAGNOSIS — Z00.00 PREVENTATIVE HEALTH CARE: ICD-10-CM

## 2023-05-30 DIAGNOSIS — E78.2 MIXED HYPERLIPIDEMIA: Primary | ICD-10-CM

## 2023-05-30 DIAGNOSIS — R73.03 PREDIABETES: ICD-10-CM

## 2023-05-30 DIAGNOSIS — I10 PRIMARY HYPERTENSION: ICD-10-CM

## 2023-05-30 RX ORDER — FENOFIBRATE 145 MG/1
145 TABLET, COATED ORAL DAILY
Qty: 90 TABLET | Refills: 1 | Status: SHIPPED | OUTPATIENT
Start: 2023-05-30

## 2023-05-30 RX ORDER — DULOXETIN HYDROCHLORIDE 60 MG/1
60 CAPSULE, DELAYED RELEASE ORAL DAILY
Qty: 90 CAPSULE | Refills: 1 | Status: SHIPPED | OUTPATIENT
Start: 2023-05-30

## 2023-05-30 RX ORDER — ATORVASTATIN CALCIUM 40 MG/1
40 TABLET, FILM COATED ORAL
Qty: 90 TABLET | Refills: 1 | Status: SHIPPED | OUTPATIENT
Start: 2023-05-30

## 2023-05-30 NOTE — PATIENT INSTRUCTIONS
Fasting blood work  Mammogram at [place]  Diet exercise  Monitor blood pressure at home  Follow-up 6 months

## 2023-05-30 NOTE — PROGRESS NOTES
"    Shelley Black is a 78 y.o. female.     History of Present Illness  78-year-old white female with hypertension, chronic arthritis pain, psoriasis and allergic rhinitis who comes in today for 6-month follow-up visit    Blood pressure 150/92 heart rate 92 she denies any chest pain, dyspnea, tachycardia or dizziness.  Patient states blood pressure runs lower at home and she did have her cuff checked against a manual blood pressure    Patient's last blood sugar 100 A1c 5.5 triglyceride 191.  We will be checking fasting labs    Weight is up 4 pounds at 160 for BMI 26.6.  She has had 5 COVID vaccines up-to-date on eye exam and her mammogram is due in September.  She is up-to-date on DEXA scan next Cologuard due 2024                 The following portions of the patient's history were reviewed and updated as appropriate: allergies, current medications, past family history, past medical history, past social history, past surgical history and problem list.    Vitals:    23 1047   BP: 151/92   BP Location: Right arm   Patient Position: Sitting   Cuff Size: Large Adult   Pulse: 53   Temp: 97.1 °F (36.2 °C)   TempSrc: Temporal   SpO2: 93%   Weight: 72.4 kg (159 lb 9.6 oz)   Height: 165.1 cm (65\")     Body mass index is 26.56 kg/m².    Past Medical History:   Diagnosis Date   • Depression    • Hyperlipidemia    • Hypertension      Past Surgical History:   Procedure Laterality Date   •  SECTION       Family History   Problem Relation Age of Onset   • Cancer Mother    • Cancer Father    • Heart disease Father      Immunization History   Administered Date(s) Administered   • COVID-19 (MODERNA) 1st,2nd,3rd Dose Monovalent 2021, 2021   • COVID-19 (UNSPECIFIED) 2021, 2021, 2021   • FLUAD TRI 65YR+ 10/10/2019   • Flu Vaccine Quad PF 6-35MO 2019   • Fluad Quad 65+ 10/10/2019, 2022   • Fluzone High Dose =>65 Years ([facility] ONLY) 2017   • Influenza Quad Vaccine " (Inpatient) 01/11/2013   • Influenza, Unspecified 09/12/2013, 12/10/2015, 10/10/2019   • Pneumococcal Conjugate 13-Valent (PCV13) 10/10/2019   • Pneumococcal Polysaccharide (PPSV23) 09/12/2013       Results Encounter on 01/29/2023   Component Date Value Ref Range Status   • WBC 01/30/2023 8.5  3.4 - 10.8 x10E3/uL Final   • RBC 01/30/2023 5.12  3.77 - 5.28 x10E6/uL Final   • Hemoglobin 01/30/2023 14.5  11.1 - 15.9 g/dL Final   • Hematocrit 01/30/2023 45.1  34.0 - 46.6 % Final   • MCV 01/30/2023 88  79 - 97 fL Final   • MCH 01/30/2023 28.3  26.6 - 33.0 pg Final   • MCHC 01/30/2023 32.2  31.5 - 35.7 g/dL Final   • RDW 01/30/2023 13.1  11.7 - 15.4 % Final   • Platelets 01/30/2023 253  150 - 450 x10E3/uL Final   • Neutrophil Rel % 01/30/2023 69  Not Estab. % Final   • Lymphocyte Rel % 01/30/2023 21  Not Estab. % Final   • Monocyte Rel % 01/30/2023 6  Not Estab. % Final   • Eosinophil Rel % 01/30/2023 3  Not Estab. % Final   • Basophil Rel % 01/30/2023 1  Not Estab. % Final   • Neutrophils Absolute 01/30/2023 5.9  1.4 - 7.0 x10E3/uL Final   • Lymphocytes Absolute 01/30/2023 1.8  0.7 - 3.1 x10E3/uL Final   • Monocytes Absolute 01/30/2023 0.5  0.1 - 0.9 x10E3/uL Final   • Eosinophils Absolute 01/30/2023 0.2  0.0 - 0.4 x10E3/uL Final   • Basophils Absolute 01/30/2023 0.1  0.0 - 0.2 x10E3/uL Final   • Immature Granulocyte Rel % 01/30/2023 0  Not Estab. % Final   • Immature Grans Absolute 01/30/2023 0.0  0.0 - 0.1 x10E3/uL Final   • Glucose 01/30/2023 100 (H)  70 - 99 mg/dL Final   • BUN 01/30/2023 25  8 - 27 mg/dL Final   • Creatinine 01/30/2023 0.81  0.57 - 1.00 mg/dL Final   • EGFR Result 01/30/2023 76  >59 mL/min/1.73 Final   • BUN/Creatinine Ratio 01/30/2023 31 (H)  12 - 28 Final   • Sodium 01/30/2023 141  134 - 144 mmol/L Final   • Potassium 01/30/2023 4.1  3.5 - 5.2 mmol/L Final   • Chloride 01/30/2023 103  96 - 106 mmol/L Final   • Total CO2 01/30/2023 24  20 - 29 mmol/L Final   • Calcium 01/30/2023 10.1  8.7 - 10.3  mg/dL Final   • Total Protein 01/30/2023 7.0  6.0 - 8.5 g/dL Final   • Albumin 01/30/2023 4.6  3.7 - 4.7 g/dL Final   • Globulin 01/30/2023 2.4  1.5 - 4.5 g/dL Final   • A/G Ratio 01/30/2023 1.9  1.2 - 2.2 Final   • Total Bilirubin 01/30/2023 0.5  0.0 - 1.2 mg/dL Final   • Alkaline Phosphatase 01/30/2023 87  44 - 121 IU/L Final   • AST (SGOT) 01/30/2023 16  0 - 40 IU/L Final   • ALT (SGPT) 01/30/2023 14  0 - 32 IU/L Final   • Total Cholesterol 01/30/2023 138  100 - 199 mg/dL Final   • Triglycerides 01/30/2023 191 (H)  0 - 149 mg/dL Final   • HDL Cholesterol 01/30/2023 37 (L)  >39 mg/dL Final   • VLDL Cholesterol Siva 01/30/2023 32  5 - 40 mg/dL Final   • LDL Chol Calc ([facility]) 01/30/2023 69  0 - 99 mg/dL Final   • LDL/HDL RATIO 01/30/2023 1.9  0.0 - 3.2 ratio Final    Comment:                                     LDL/HDL Ratio                                              Men  Women                                1/2 Avg.Risk  1.0    1.5                                    Avg.Risk  3.6    3.2                                 2X Avg.Risk  6.2    5.0                                 3X Avg.Risk  8.0    6.1     • Hemoglobin A1C 01/30/2023 5.5  4.8 - 5.6 % Final    Comment:          Prediabetes: 5.7 - 6.4           Diabetes: >6.4           Glycemic control for adults with diabetes: <7.0           Review of Systems   Constitutional: Negative.    HENT: Negative.    Eyes: Negative.    Respiratory: Negative.    Cardiovascular: Negative.    Gastrointestinal: Negative.    Genitourinary: Negative.    Musculoskeletal: Negative.    Skin: Negative.    Neurological: Negative.    Psychiatric/Behavioral: Negative.        Objective   Physical Exam  Constitutional:       Appearance: Normal appearance.   HENT:      Head: Normocephalic.   Cardiovascular:      Rate and Rhythm: Normal rate and regular rhythm.      Pulses: Normal pulses.      Heart sounds: Normal heart sounds.   Pulmonary:      Effort: Pulmonary effort is normal.   Abdominal:       General: Bowel sounds are normal.   Musculoskeletal:         General: Normal range of motion.   Skin:     General: Skin is warm and dry.   Neurological:      General: No focal deficit present.      Mental Status: She is alert.   Psychiatric:         Mood and Affect: Mood normal.         Behavior: Behavior normal.         Procedures    Assessment & Plan   Diagnoses and all orders for this visit:    1. Mixed hyperlipidemia (Primary)  -     Lipid Panel With LDL / HDL Ratio    2. Prediabetes  -     Comprehensive Metabolic Panel  -     Hemoglobin A1c    3. Preventative health care  -     CBC & Differential  -     TSH+Free T4  -     T3    4. Primary hypertension    5. Overweight (BMI 25.0-29.9)    Other orders  -     atorvastatin (LIPITOR) 40 MG tablet; Take 1 tablet by mouth every night at bedtime.  Dispense: 90 tablet; Refill: 1  -     DULoxetine (CYMBALTA) 60 MG capsule; Take 1 capsule by mouth Daily.  Dispense: 90 capsule; Refill: 1  -     fenofibrate (TRICOR) 145 MG tablet; Take 1 tablet by mouth Daily.  Dispense: 90 tablet; Refill: 1  -     Probiotic Product (Probiotic-10) chewable tablet; Chew 1 each Daily.  Dispense: 90 tablet; Refill: 1          Current Outpatient Medications:   •  amLODIPine (NORVASC) 2.5 MG tablet, TAKE 1 TABLET EVERY NIGHT AT  BEDTIME, Disp: 90 tablet, Rfl: 1  •  atorvastatin (LIPITOR) 40 MG tablet, Take 1 tablet by mouth every night at bedtime., Disp: 90 tablet, Rfl: 1  •  bisoprolol-hydrochlorothiazide (ZIAC) 10-6.25 MG per tablet, TAKE 1 TABLET DAILY, Disp: 90 tablet, Rfl: 1  •  diclofenac (VOLTAREN) 75 MG EC tablet, TAKE 1 TABLET BY MOUTH TWICE DAILY AS NEEDED FOR JOINT PAIN, Disp: 60 tablet, Rfl: 11  •  DULoxetine (CYMBALTA) 60 MG capsule, Take 1 capsule by mouth Daily., Disp: 90 capsule, Rfl: 1  •  fenofibrate (TRICOR) 145 MG tablet, Take 1 tablet by mouth Daily., Disp: 90 tablet, Rfl: 1  •  Probiotic Product (Probiotic-10) chewable tablet, Chew 1 each Daily., Disp: 90 tablet, Rfl:  1           Marley Santos, APRN 5/30/2023 11:55 EDT  This note has been electronically signed

## 2023-05-31 LAB
ALBUMIN SERPL-MCNC: 4.7 G/DL (ref 3.7–4.7)
ALBUMIN/GLOB SERPL: 2 {RATIO} (ref 1.2–2.2)
ALP SERPL-CCNC: 84 IU/L (ref 44–121)
ALT SERPL-CCNC: 26 IU/L (ref 0–32)
AST SERPL-CCNC: 22 IU/L (ref 0–40)
BASOPHILS # BLD AUTO: 0.1 X10E3/UL (ref 0–0.2)
BASOPHILS NFR BLD AUTO: 1 %
BILIRUB SERPL-MCNC: 0.5 MG/DL (ref 0–1.2)
BUN SERPL-MCNC: 26 MG/DL (ref 8–27)
BUN/CREAT SERPL: 34 (ref 12–28)
CALCIUM SERPL-MCNC: 10 MG/DL (ref 8.7–10.3)
CHLORIDE SERPL-SCNC: 106 MMOL/L (ref 96–106)
CHOLEST SERPL-MCNC: 147 MG/DL (ref 100–199)
CO2 SERPL-SCNC: 22 MMOL/L (ref 20–29)
CREAT SERPL-MCNC: 0.77 MG/DL (ref 0.57–1)
EGFRCR SERPLBLD CKD-EPI 2021: 80 ML/MIN/1.73
EOSINOPHIL # BLD AUTO: 0.3 X10E3/UL (ref 0–0.4)
EOSINOPHIL NFR BLD AUTO: 4 %
ERYTHROCYTE [DISTWIDTH] IN BLOOD BY AUTOMATED COUNT: 13.3 % (ref 11.7–15.4)
GLOBULIN SER CALC-MCNC: 2.3 G/DL (ref 1.5–4.5)
GLUCOSE SERPL-MCNC: 88 MG/DL (ref 70–99)
HBA1C MFR BLD: 5.8 % (ref 4.8–5.6)
HCT VFR BLD AUTO: 44.2 % (ref 34–46.6)
HDLC SERPL-MCNC: 39 MG/DL
HGB BLD-MCNC: 14.5 G/DL (ref 11.1–15.9)
IMM GRANULOCYTES # BLD AUTO: 0.1 X10E3/UL (ref 0–0.1)
IMM GRANULOCYTES NFR BLD AUTO: 1 %
LDLC SERPL CALC-MCNC: 76 MG/DL (ref 0–99)
LDLC/HDLC SERPL: 1.9 RATIO (ref 0–3.2)
LYMPHOCYTES # BLD AUTO: 1.7 X10E3/UL (ref 0.7–3.1)
LYMPHOCYTES NFR BLD AUTO: 22 %
MCH RBC QN AUTO: 28.9 PG (ref 26.6–33)
MCHC RBC AUTO-ENTMCNC: 32.8 G/DL (ref 31.5–35.7)
MCV RBC AUTO: 88 FL (ref 79–97)
MONOCYTES # BLD AUTO: 0.5 X10E3/UL (ref 0.1–0.9)
MONOCYTES NFR BLD AUTO: 6 %
NEUTROPHILS # BLD AUTO: 5.3 X10E3/UL (ref 1.4–7)
NEUTROPHILS NFR BLD AUTO: 66 %
PLATELET # BLD AUTO: 252 X10E3/UL (ref 150–450)
POTASSIUM SERPL-SCNC: 4.4 MMOL/L (ref 3.5–5.2)
PROT SERPL-MCNC: 7 G/DL (ref 6–8.5)
RBC # BLD AUTO: 5.01 X10E6/UL (ref 3.77–5.28)
SODIUM SERPL-SCNC: 144 MMOL/L (ref 134–144)
T3 SERPL-MCNC: 95 NG/DL (ref 71–180)
T4 FREE SERPL-MCNC: 1.18 NG/DL (ref 0.82–1.77)
TRIGL SERPL-MCNC: 187 MG/DL (ref 0–149)
TSH SERPL DL<=0.005 MIU/L-ACNC: 1.91 UIU/ML (ref 0.45–4.5)
VLDLC SERPL CALC-MCNC: 32 MG/DL (ref 5–40)
WBC # BLD AUTO: 8 X10E3/UL (ref 3.4–10.8)

## 2023-09-10 RX ORDER — AMLODIPINE BESYLATE 2.5 MG/1
TABLET ORAL
Qty: 90 TABLET | Refills: 1 | Status: SHIPPED | OUTPATIENT
Start: 2023-09-10

## 2023-09-10 RX ORDER — BISOPROLOL FUMARATE AND HYDROCHLOROTHIAZIDE 10; 6.25 MG/1; MG/1
TABLET ORAL
Qty: 90 TABLET | Refills: 1 | Status: SHIPPED | OUTPATIENT
Start: 2023-09-10

## 2023-09-10 RX ORDER — DULOXETIN HYDROCHLORIDE 60 MG/1
60 CAPSULE, DELAYED RELEASE ORAL DAILY
Qty: 90 CAPSULE | Refills: 1 | Status: SHIPPED | OUTPATIENT
Start: 2023-09-10

## 2023-09-10 RX ORDER — ATORVASTATIN CALCIUM 40 MG/1
40 TABLET, FILM COATED ORAL
Qty: 90 TABLET | Refills: 1 | Status: SHIPPED | OUTPATIENT
Start: 2023-09-10

## 2024-03-25 RX ORDER — AMLODIPINE BESYLATE 2.5 MG/1
TABLET ORAL
Qty: 90 TABLET | Refills: 3 | OUTPATIENT
Start: 2024-03-25

## 2024-03-25 RX ORDER — BISOPROLOL FUMARATE AND HYDROCHLOROTHIAZIDE 10; 6.25 MG/1; MG/1
TABLET ORAL
Qty: 90 TABLET | Refills: 3 | OUTPATIENT
Start: 2024-03-25

## 2024-04-15 ENCOUNTER — OFFICE VISIT (OUTPATIENT)
Dept: FAMILY MEDICINE CLINIC | Facility: CLINIC | Age: 79
End: 2024-04-15
Payer: MEDICARE

## 2024-04-15 VITALS
HEART RATE: 58 BPM | HEIGHT: 65 IN | BODY MASS INDEX: 27.32 KG/M2 | SYSTOLIC BLOOD PRESSURE: 128 MMHG | OXYGEN SATURATION: 94 % | TEMPERATURE: 97.5 F | WEIGHT: 164 LBS | DIASTOLIC BLOOD PRESSURE: 68 MMHG

## 2024-04-15 DIAGNOSIS — R73.09 ELEVATED HEMOGLOBIN A1C: ICD-10-CM

## 2024-04-15 DIAGNOSIS — Z00.00 PREVENTATIVE HEALTH CARE: ICD-10-CM

## 2024-04-15 DIAGNOSIS — M17.12 PRIMARY OSTEOARTHRITIS OF LEFT KNEE: ICD-10-CM

## 2024-04-15 DIAGNOSIS — E78.2 MIXED HYPERLIPIDEMIA: Primary | ICD-10-CM

## 2024-04-15 DIAGNOSIS — E66.3 OVERWEIGHT (BMI 25.0-29.9): ICD-10-CM

## 2024-04-15 PROCEDURE — 1170F FXNL STATUS ASSESSED: CPT | Performed by: NURSE PRACTITIONER

## 2024-04-15 PROCEDURE — 3074F SYST BP LT 130 MM HG: CPT | Performed by: NURSE PRACTITIONER

## 2024-04-15 PROCEDURE — 96160 PT-FOCUSED HLTH RISK ASSMT: CPT | Performed by: NURSE PRACTITIONER

## 2024-04-15 PROCEDURE — 3078F DIAST BP <80 MM HG: CPT | Performed by: NURSE PRACTITIONER

## 2024-04-15 PROCEDURE — G0439 PPPS, SUBSEQ VISIT: HCPCS | Performed by: NURSE PRACTITIONER

## 2024-04-15 RX ORDER — ATORVASTATIN CALCIUM 40 MG/1
40 TABLET, FILM COATED ORAL
Qty: 90 TABLET | Refills: 1 | Status: SHIPPED | OUTPATIENT
Start: 2024-04-15

## 2024-04-15 RX ORDER — AMLODIPINE BESYLATE 2.5 MG/1
2.5 TABLET ORAL
Qty: 90 TABLET | Refills: 1 | Status: SHIPPED | OUTPATIENT
Start: 2024-04-15

## 2024-04-15 RX ORDER — BISOPROLOL FUMARATE AND HYDROCHLOROTHIAZIDE 10; 6.25 MG/1; MG/1
1 TABLET ORAL DAILY
Qty: 90 TABLET | Refills: 1 | Status: SHIPPED | OUTPATIENT
Start: 2024-04-15 | End: 2024-04-15 | Stop reason: SDUPTHER

## 2024-04-15 RX ORDER — DULOXETIN HYDROCHLORIDE 60 MG/1
60 CAPSULE, DELAYED RELEASE ORAL DAILY
Qty: 90 CAPSULE | Refills: 1 | Status: SHIPPED | OUTPATIENT
Start: 2024-04-15 | End: 2024-04-15 | Stop reason: SDUPTHER

## 2024-04-15 RX ORDER — FENOFIBRATE 145 MG/1
145 TABLET, COATED ORAL DAILY
Qty: 90 TABLET | Refills: 1 | Status: SHIPPED | OUTPATIENT
Start: 2024-04-15

## 2024-04-15 RX ORDER — DULOXETIN HYDROCHLORIDE 60 MG/1
60 CAPSULE, DELAYED RELEASE ORAL DAILY
Qty: 90 CAPSULE | Refills: 1 | Status: SHIPPED | OUTPATIENT
Start: 2024-04-15

## 2024-04-15 RX ORDER — AMLODIPINE BESYLATE 2.5 MG/1
2.5 TABLET ORAL
Qty: 90 TABLET | Refills: 1 | Status: SHIPPED | OUTPATIENT
Start: 2024-04-15 | End: 2024-04-15 | Stop reason: SDUPTHER

## 2024-04-15 RX ORDER — ATORVASTATIN CALCIUM 40 MG/1
40 TABLET, FILM COATED ORAL
Qty: 90 TABLET | Refills: 1 | Status: SHIPPED | OUTPATIENT
Start: 2024-04-15 | End: 2024-04-15 | Stop reason: SDUPTHER

## 2024-04-15 RX ORDER — BISOPROLOL FUMARATE AND HYDROCHLOROTHIAZIDE 10; 6.25 MG/1; MG/1
1 TABLET ORAL DAILY
Qty: 90 TABLET | Refills: 1 | Status: SHIPPED | OUTPATIENT
Start: 2024-04-15

## 2024-04-15 NOTE — PROGRESS NOTES
The ABCs of the Annual Wellness Visit  Subsequent Medicare Wellness Visit    Subjective    Shelley Black is a 79 y.o. female who presents for a Subsequent Medicare Wellness Visit.    The following portions of the patient's history were reviewed and   updated as appropriate: allergies, current medications, past family history, past medical history, past social history, past surgical history, and problem list.    Compared to one year ago, the patient feels her physical   health is the same.    Compared to one year ago, the patient feels her mental   health is the same.    Recent Hospitalizations:  She was not admitted to the hospital during the last year.       Current Medical Providers:  Patient Care Team:  Marley Santos APRN as PCP - General    Outpatient Medications Prior to Visit   Medication Sig Dispense Refill    diclofenac (VOLTAREN) 75 MG EC tablet TAKE 1 TABLET BY MOUTH TWICE DAILY AS NEEDED FOR JOINT PAIN 60 tablet 11    Probiotic Product (Probiotic-10) chewable tablet Chew 1 each Daily. 90 tablet 1    amLODIPine (NORVASC) 2.5 MG tablet TAKE 1 TABLET BY MOUTH EVERY  NIGHT AT BEDTIME 90 tablet 1    atorvastatin (LIPITOR) 40 MG tablet TAKE 1 TABLET BY MOUTH EVERY  NIGHT AT BEDTIME 90 tablet 1    bisoprolol-hydrochlorothiazide (ZIAC) 10-6.25 MG per tablet TAKE 1 TABLET BY MOUTH DAILY 90 tablet 1    DULoxetine (CYMBALTA) 60 MG capsule TAKE 1 CAPSULE BY MOUTH DAILY 90 capsule 1    fenofibrate (TRICOR) 145 MG tablet Take 1 tablet by mouth Daily. 90 tablet 1     No facility-administered medications prior to visit.       No opioid medication identified on active medication list. I have reviewed chart for other potential  high risk medication/s and harmful drug interactions in the elderly.        Aspirin is not on active medication list.  Aspirin use is not indicated based on review of current medical condition/s. Risk of harm outweighs potential benefits.  .    Patient Active Problem List   Diagnosis     "Hypertension    Psoriasis    Encounter for screening for lipoid disorders    Preoperative evaluation to rule out surgical contraindication    Depression    Bunion, left    Overweight (BMI 25.0-29.9)    Arthritis    Allergic rhinitis due to pollen    Mixed hyperlipidemia    High triglycerides    Skin lesion of chest wall    Chronic pain of left knee    Primary osteoarthritis of left knee    Left wrist fracture, sequela     Advance Care Planning   Advance Care Planning     Advance Directive is not on file.  ACP discussion was held with the patient during this visit. Patient has an advance directive (not in EMR), copy requested.     Objective    Vitals:    04/15/24 1549 04/15/24 1602   BP: 162/98 128/68   BP Location: Right arm Right arm   Patient Position: Sitting Sitting   Cuff Size: Adult Adult   Pulse: 58    Temp: 97.5 °F (36.4 °C)    TempSrc: Infrared    SpO2: 94%    Weight: 74.4 kg (164 lb)    Height: 165.1 cm (65\")      Estimated body mass index is 27.29 kg/m² as calculated from the following:    Height as of this encounter: 165.1 cm (65\").    Weight as of this encounter: 74.4 kg (164 lb).           Does the patient have evidence of cognitive impairment? No          HEALTH RISK ASSESSMENT    Smoking Status:  Social History     Tobacco Use   Smoking Status Former    Current packs/day: 0.00    Average packs/day: 1 pack/day for 28.0 years (28.0 ttl pk-yrs)    Types: Cigarettes    Start date:     Quit date:     Years since quittin.3   Smokeless Tobacco Never     Alcohol Consumption:  Social History     Substance and Sexual Activity   Alcohol Use No     Fall Risk Screen:    MILAGROSADI Fall Risk Assessment was completed, and patient is at LOW risk for falls.Assessment completed on:4/15/2024    Depression Screenin/15/2024     3:50 PM   PHQ-2/PHQ-9 Depression Screening   Little Interest or Pleasure in Doing Things 0-->not at all   Feeling Down, Depressed or Hopeless 0-->not at all   Trouble Falling or " Staying Asleep, or Sleeping Too Much 0-->not at all   Feeling Tired or Having Little Energy 0-->not at all   Poor Appetite or Overeating 0-->not at all   Feeling Bad about Yourself - or that You are a Failure or Have Let Yourself or Your Family Down 0-->not at all   Trouble Concentrating on Things, Such as Reading the Newspaper or Watching Television 0-->not at all   Moving or Speaking So Slowly that Other People Could Have Noticed? Or the Opposite - Being So Fidgety 0-->not at all   Thoughts that You Would be Better Off Dead or of Hurting Yourself in Some Way 0-->not at all   PHQ-9: Brief Depression Severity Measure Score 0   If You Checked Off Any Problems, How Difficult Have These Problems Made It For You to Do Your Work, Take Care of Things at Home, or Get Along with Other People? not difficult at all       Health Habits and Functional and Cognitive Screenin/15/2024     3:00 PM   Functional & Cognitive Status   Do you have difficulty preparing food and eating? No   Do you have difficulty bathing yourself, getting dressed or grooming yourself? No   Do you have difficulty using the toilet? No   Do you have difficulty moving around from place to place? No   Do you have trouble with steps or getting out of a bed or a chair? No   Current Diet Low Carb Diet   Dental Exam Up to date   Eye Exam Up to date   Exercise (times per week) 3 times per week   Current Exercises Include Walking   Do you need help using the phone?  No   Are you deaf or do you have serious difficulty hearing?  No   Do you need help to go to places out of walking distance? No   Do you need help shopping? No   Do you need help preparing meals?  No   Do you need help with housework?  No   Do you need help with laundry? No   Do you need help taking your medications? No   Do you need help managing money? No   Do you ever drive or ride in a car without wearing a seat belt? No   Have you felt unusual stress, anger or loneliness in the last month?  No   Who do you live with? Spouse   If you need help, do you have trouble finding someone available to you? No   Have you been bothered in the last four weeks by sexual problems? No   Do you have difficulty concentrating, remembering or making decisions? No       Age-appropriate Screening Schedule:  Refer to the list below for future screening recommendations based on patient's age, sex and/or medical conditions. Orders for these recommended tests are listed in the plan section. The patient has been provided with a written plan.    Health Maintenance   Topic Date Due    ZOSTER VACCINE (1 of 2) Never done    RSV Vaccine - Adults (1 - 1-dose 60+ series) Never done    DXA SCAN  08/31/2022    COVID-19 Vaccine (6 - 2023-24 season) 09/01/2023    ANNUAL WELLNESS VISIT  01/24/2024    HEPATITIS C SCREENING  05/30/2024 (Originally 10/8/2019)    TDAP/TD VACCINES (1 - Tdap) 05/30/2024 (Originally 3/29/1964)    LIPID PANEL  05/30/2024    BMI FOLLOWUP  05/30/2024    INFLUENZA VACCINE  08/01/2024    Pneumococcal Vaccine 65+  Completed    COLORECTAL CANCER SCREENING  Discontinued                  CMS Preventative Services Quick Reference  Risk Factors Identified During Encounter  None Identified  The above risks/problems have been discussed with the patient.  Pertinent information has been shared with the patient in the After Visit Summary.  An After Visit Summary and PPPS were made available to the patient.    Follow Up:   Next Medicare Wellness visit to be scheduled in 1 year.       Additional E&M Note during same encounter follows:  Patient has multiple medical problems which are significant and separately identifiable that require additional work above and beyond the Medicare Wellness Visit.      Chief Complaint  Medicare Wellness-subsequent, Med Refill, and Hypertension    Subjective        79-year-old white female with hypertension, chronic arthritis pain, psoriasis and allergic rhinitis who comes in today for Medicare  "wellness visit    Blood pressure 128/68 heart rate 58 with systolic murmur she denies any chest pain, dyspnea, tachycardia or dizziness    Patient been having left knee pain she did go to Ortho and injected it and it is much better now    Weight is 164 which is up 5 pounds with a BMI 27.3.  She has had 5 COVID vaccines up-to-date on eye exam, I ordered her mammogram and DEXA scan at [place] which she will schedule and her next Cologuard is due in September 2024.  She is up-to-date on hepatitis C.            Fasting blood work  Mammogram/DEXA scan  Diet exercise  Follow-up 6 months      Shelley Blcak is also being seen today for     Review of Systems   Constitutional: Negative.    HENT: Negative.     Respiratory: Negative.     Cardiovascular: Negative.    Gastrointestinal: Negative.    Genitourinary: Negative.    Musculoskeletal:         Left knee pain   Skin: Negative.    Neurological: Negative.    Psychiatric/Behavioral: Negative.         Objective   Vital Signs:  /68 (BP Location: Right arm, Patient Position: Sitting, Cuff Size: Adult)   Pulse 58   Temp 97.5 °F (36.4 °C) (Infrared)   Ht 165.1 cm (65\")   Wt 74.4 kg (164 lb)   SpO2 94%   BMI 27.29 kg/m²     Physical Exam  Constitutional:       Appearance: Normal appearance.   HENT:      Head: Normocephalic.   Cardiovascular:      Rate and Rhythm: Normal rate and regular rhythm.      Pulses: Normal pulses.      Heart sounds: Murmur heard.   Pulmonary:      Effort: Pulmonary effort is normal.      Breath sounds: Normal breath sounds.   Abdominal:      General: Bowel sounds are normal.   Musculoskeletal:         General: Normal range of motion.   Skin:     General: Skin is warm.   Neurological:      General: No focal deficit present.      Mental Status: She is alert and oriented to person, place, and time.   Psychiatric:         Mood and Affect: Mood normal.         Behavior: Behavior normal.                         Assessment and Plan   Diagnoses and all " orders for this visit:    1. Mixed hyperlipidemia (Primary)  -     Lipid Panel With LDL / HDL Ratio; Future    2. Elevated hemoglobin A1c  -     Comprehensive Metabolic Panel; Future  -     Hemoglobin A1c; Future    3. Preventative health care  -     CBC & Differential; Future    4. Overweight (BMI 25.0-29.9)    5. Primary osteoarthritis of left knee    Other orders  -     Discontinue: amLODIPine (NORVASC) 2.5 MG tablet; Take 1 tablet by mouth every night at bedtime.  Dispense: 90 tablet; Refill: 1  -     Discontinue: atorvastatin (LIPITOR) 40 MG tablet; Take 1 tablet by mouth every night at bedtime.  Dispense: 90 tablet; Refill: 1  -     Discontinue: bisoprolol-hydrochlorothiazide (ZIAC) 10-6.25 MG per tablet; Take 1 tablet by mouth Daily.  Dispense: 90 tablet; Refill: 1  -     Discontinue: DULoxetine (CYMBALTA) 60 MG capsule; Take 1 capsule by mouth Daily.  Dispense: 90 capsule; Refill: 1  -     fenofibrate (TRICOR) 145 MG tablet; Take 1 tablet by mouth Daily.  Dispense: 90 tablet; Refill: 1  -     amLODIPine (NORVASC) 2.5 MG tablet; Take 1 tablet by mouth every night at bedtime.  Dispense: 90 tablet; Refill: 1  -     atorvastatin (LIPITOR) 40 MG tablet; Take 1 tablet by mouth every night at bedtime.  Dispense: 90 tablet; Refill: 1  -     bisoprolol-hydrochlorothiazide (ZIAC) 10-6.25 MG per tablet; Take 1 tablet by mouth Daily.  Dispense: 90 tablet; Refill: 1  -     DULoxetine (CYMBALTA) 60 MG capsule; Take 1 capsule by mouth Daily.  Dispense: 90 capsule; Refill: 1             Follow Up   Return in about 6 months (around 10/15/2024).  Patient was given instructions and counseling regarding her condition or for health maintenance advice. Please see specific information pulled into the AVS if appropriate.

## 2024-10-09 RX ORDER — AMLODIPINE BESYLATE 2.5 MG/1
2.5 TABLET ORAL
Qty: 90 TABLET | Refills: 3 | Status: SHIPPED | OUTPATIENT
Start: 2024-10-09

## 2024-10-09 RX ORDER — ATORVASTATIN CALCIUM 40 MG/1
40 TABLET, FILM COATED ORAL
Qty: 90 TABLET | Refills: 3 | Status: SHIPPED | OUTPATIENT
Start: 2024-10-09

## 2024-10-09 RX ORDER — DULOXETIN HYDROCHLORIDE 60 MG/1
60 CAPSULE, DELAYED RELEASE ORAL DAILY
Qty: 90 CAPSULE | Refills: 3 | Status: SHIPPED | OUTPATIENT
Start: 2024-10-09

## 2024-10-09 RX ORDER — BISOPROLOL FUMARATE AND HYDROCHLOROTHIAZIDE 10; 6.25 MG/1; MG/1
1 TABLET ORAL DAILY
Qty: 90 TABLET | Refills: 3 | Status: SHIPPED | OUTPATIENT
Start: 2024-10-09

## 2024-10-23 ENCOUNTER — OFFICE VISIT (OUTPATIENT)
Dept: FAMILY MEDICINE CLINIC | Facility: CLINIC | Age: 79
End: 2024-10-23
Payer: MEDICARE

## 2024-10-23 VITALS
WEIGHT: 167 LBS | SYSTOLIC BLOOD PRESSURE: 120 MMHG | BODY MASS INDEX: 27.82 KG/M2 | HEART RATE: 63 BPM | DIASTOLIC BLOOD PRESSURE: 80 MMHG | OXYGEN SATURATION: 97 % | TEMPERATURE: 97.1 F | HEIGHT: 65 IN

## 2024-10-23 DIAGNOSIS — Z78.0 POSTMENOPAUSAL: ICD-10-CM

## 2024-10-23 DIAGNOSIS — M25.562 CHRONIC PAIN OF LEFT KNEE: Primary | ICD-10-CM

## 2024-10-23 DIAGNOSIS — Z00.00 PREVENTATIVE HEALTH CARE: ICD-10-CM

## 2024-10-23 DIAGNOSIS — R73.09 ELEVATED HEMOGLOBIN A1C: ICD-10-CM

## 2024-10-23 DIAGNOSIS — Z12.11 COLON CANCER SCREENING: ICD-10-CM

## 2024-10-23 DIAGNOSIS — E78.2 MIXED HYPERLIPIDEMIA: ICD-10-CM

## 2024-10-23 DIAGNOSIS — R00.0 TACHYCARDIA: ICD-10-CM

## 2024-10-23 DIAGNOSIS — M17.12 PRIMARY OSTEOARTHRITIS OF LEFT KNEE: ICD-10-CM

## 2024-10-23 DIAGNOSIS — G89.29 CHRONIC PAIN OF LEFT KNEE: Primary | ICD-10-CM

## 2024-10-23 NOTE — PATIENT INSTRUCTIONS
Fasting blood work  Orthopedic referral  Left knee x-ray  DEXA scan at [facility]  Follow-up 6 months

## 2024-10-23 NOTE — PROGRESS NOTES
"    Shelley Black is a 79 y.o. female.     History of Present Illness  79-year-old white female with history of hypertension, chronic arthritis pain, psoriasis allergic rhinitis who comes in today for follow-up visit and fasting blood work    Blood pressure 120/80 heart rate 62 with small systolic murmur she denies any chest pain, dyspnea, tachycardia or dizziness    Patient's main complaint is left knee she has been going to an orthopedic down in [place] who told her she would bone-on-bone he did try injections and gel but nothing has helped with the pain.  Will get x-ray today and refer her to Ortho for possible knee replacement    Patient has no other complaints.  Weight is 167 with a BMI of 27.8.  She has had 5 COVID vaccines up-to-date on eye exam.  Mammogram is due in 2025.  Will order her bone scan at [facility] which she will schedule and I am calling her in a Cologuard          Fasting blood work  Orthopedic referral  Left knee x-ray  DEXA scan at [facility]  Follow-up 6 months       The following portions of the patient's history were reviewed and updated as appropriate: allergies, current medications, past family history, past medical history, past social history, past surgical history, and problem list.    Vitals:    10/23/24 0939   BP: 120/80   BP Location: Right arm   Patient Position: Sitting   Cuff Size: Adult   Pulse: 63   Temp: 97.1 °F (36.2 °C)   TempSrc: Infrared   SpO2: 97%   Weight: 75.8 kg (167 lb)   Height: 165.1 cm (65\")     Body mass index is 27.79 kg/m².  BMI is >= 25 and <30. (Overweight) The following options were offered after discussion;: weight loss educational material (shared in after visit summary)       Past Medical History:   Diagnosis Date    Depression     Hyperlipidemia     Hypertension      Past Surgical History:   Procedure Laterality Date     SECTION       Family History   Problem Relation Age of Onset    Cancer Mother     Cancer Father     Heart " disease Father      Immunization History   Administered Date(s) Administered    COVID-19 (MODERNA) 1st,2nd,3rd Dose Monovalent 02/19/2021, 03/19/2021    COVID-19 (UNSPECIFIED) 02/19/2021, 03/19/2021, 11/22/2021    FLUAD TRI 65YR+ 10/10/2019    Flu Vaccine Quad PF 6-35MO 01/16/2019    Fluad Quad 65+ 10/10/2019, 11/11/2022    Fluzone  >6mos 01/11/2013    Fluzone High-Dose 65+YRS 11/16/2017    Influenza, Unspecified 09/12/2013, 12/10/2015, 10/10/2019    Pneumococcal Conjugate 13-Valent (PCV13) 10/10/2019    Pneumococcal Polysaccharide (PPSV23) 09/12/2013       Results Encounter on 04/20/2024   Component Date Value Ref Range Status    WBC 04/18/2024 6.7  3.4 - 10.8 x10E3/uL Final    RBC 04/18/2024 4.88  3.77 - 5.28 x10E6/uL Final    Hemoglobin 04/18/2024 13.8  11.1 - 15.9 g/dL Final    Hematocrit 04/18/2024 44.3  34.0 - 46.6 % Final    MCV 04/18/2024 91  79 - 97 fL Final    MCH 04/18/2024 28.3  26.6 - 33.0 pg Final    MCHC 04/18/2024 31.2 (L)  31.5 - 35.7 g/dL Final    RDW 04/18/2024 12.9  11.7 - 15.4 % Final    Platelets 04/18/2024 225  150 - 450 x10E3/uL Final    Neutrophil Rel % 04/18/2024 62  Not Estab. % Final    Lymphocyte Rel % 04/18/2024 27  Not Estab. % Final    Monocyte Rel % 04/18/2024 6  Not Estab. % Final    Eosinophil Rel % 04/18/2024 4  Not Estab. % Final    Basophil Rel % 04/18/2024 1  Not Estab. % Final    Neutrophils Absolute 04/18/2024 4.2  1.4 - 7.0 x10E3/uL Final    Lymphocytes Absolute 04/18/2024 1.8  0.7 - 3.1 x10E3/uL Final    Monocytes Absolute 04/18/2024 0.4  0.1 - 0.9 x10E3/uL Final    Eosinophils Absolute 04/18/2024 0.3  0.0 - 0.4 x10E3/uL Final    Basophils Absolute 04/18/2024 0.0  0.0 - 0.2 x10E3/uL Final    Immature Granulocyte Rel % 04/18/2024 0  Not Estab. % Final    Immature Grans Absolute 04/18/2024 0.0  0.0 - 0.1 x10E3/uL Final    Glucose 04/18/2024 93  70 - 99 mg/dL Final    BUN 04/18/2024 23  8 - 27 mg/dL Final    Creatinine 04/18/2024 0.74  0.57 - 1.00 mg/dL Final    EGFR Result  04/18/2024 83  >59 mL/min/1.73 Final    BUN/Creatinine Ratio 04/18/2024 31 (H)  12 - 28 Final    Sodium 04/18/2024 147 (H)  134 - 144 mmol/L Final    Potassium 04/18/2024 4.1  3.5 - 5.2 mmol/L Final    Chloride 04/18/2024 107 (H)  96 - 106 mmol/L Final    Total CO2 04/18/2024 27  20 - 29 mmol/L Final    Calcium 04/18/2024 9.3  8.7 - 10.3 mg/dL Final    Total Protein 04/18/2024 6.5  6.0 - 8.5 g/dL Final    Albumin 04/18/2024 4.2  3.8 - 4.8 g/dL Final    Globulin 04/18/2024 2.3  1.5 - 4.5 g/dL Final    A/G Ratio 04/18/2024 1.8  1.2 - 2.2 Final    Total Bilirubin 04/18/2024 0.4  0.0 - 1.2 mg/dL Final    Alkaline Phosphatase 04/18/2024 75  44 - 121 IU/L Final    AST (SGOT) 04/18/2024 12  0 - 40 IU/L Final    ALT (SGPT) 04/18/2024 17  0 - 32 IU/L Final    Hemoglobin A1C 04/18/2024 6.3 (H)  4.8 - 5.6 % Final    Comment:          Prediabetes: 5.7 - 6.4           Diabetes: >6.4           Glycemic control for adults with diabetes: <7.0      Total Cholesterol 04/18/2024 140  100 - 199 mg/dL Final    Triglycerides 04/18/2024 187 (H)  0 - 149 mg/dL Final    HDL Cholesterol 04/18/2024 44  >39 mg/dL Final    VLDL Cholesterol Siva 04/18/2024 31  5 - 40 mg/dL Final    LDL Chol Calc (NIH) 04/18/2024 65  0 - 99 mg/dL Final    LDL/HDL RATIO 04/18/2024 1.5  0.0 - 3.2 ratio Final    Comment:                                     LDL/HDL Ratio                                              Men  Women                                1/2 Avg.Risk  1.0    1.5                                    Avg.Risk  3.6    3.2                                 2X Avg.Risk  6.2    5.0                                 3X Avg.Risk  8.0    6.1           Review of Systems   Constitutional: Negative.    HENT: Negative.     Respiratory: Negative.     Cardiovascular: Negative.    Gastrointestinal: Negative.    Genitourinary: Negative.    Musculoskeletal:         Left knee pain   Skin: Negative.    Neurological: Negative.    Psychiatric/Behavioral: Negative.          Objective   Physical Exam  Constitutional:       Appearance: Normal appearance.   HENT:      Head: Normocephalic.   Cardiovascular:      Rate and Rhythm: Normal rate and regular rhythm.      Pulses: Normal pulses.      Heart sounds: Normal heart sounds.   Pulmonary:      Effort: Pulmonary effort is normal.      Breath sounds: Normal breath sounds.   Abdominal:      General: Bowel sounds are normal.   Musculoskeletal:         General: Normal range of motion.      Comments: Left knee pain   Skin:     General: Skin is warm.   Neurological:      General: No focal deficit present.      Mental Status: She is alert and oriented to person, place, and time.   Psychiatric:         Mood and Affect: Mood normal.         Behavior: Behavior normal.         Procedures    Assessment & Plan   Diagnoses and all orders for this visit:    1. Chronic pain of left knee (Primary)  -     XR Knee 3 View Left; Future  -     Ambulatory Referral to Orthopedic Surgery    2. Mixed hyperlipidemia  -     Lipid Panel With LDL / HDL Ratio    3. Elevated hemoglobin A1c  -     Comprehensive Metabolic Panel  -     Hemoglobin A1c    4. Preventative health care  -     CBC & Differential    5. Tachycardia  -     TSH+Free T4  -     T3    6. BMI 27.0-27.9,adult    7. Primary osteoarthritis of left knee    8. Postmenopausal  -     DEXA Bone Density Axial; Future    9. Colon cancer screening  -     Cologuard - Stool, Per Rectum; Future           Current Outpatient Medications:     amLODIPine (NORVASC) 2.5 MG tablet, TAKE 1 TABLET BY MOUTH EVERY  NIGHT AT BEDTIME, Disp: 90 tablet, Rfl: 3    atorvastatin (LIPITOR) 40 MG tablet, TAKE 1 TABLET BY MOUTH EVERY  NIGHT AT BEDTIME, Disp: 90 tablet, Rfl: 3    bisoprolol-hydrochlorothiazide (ZIAC) 10-6.25 MG per tablet, TAKE 1 TABLET BY MOUTH DAILY, Disp: 90 tablet, Rfl: 3    diclofenac (VOLTAREN) 75 MG EC tablet, TAKE 1 TABLET BY MOUTH TWICE DAILY AS NEEDED FOR JOINT PAIN, Disp: 60 tablet, Rfl: 11    DULoxetine  (CYMBALTA) 60 MG capsule, TAKE 1 CAPSULE BY MOUTH DAILY, Disp: 90 capsule, Rfl: 3    fenofibrate (TRICOR) 145 MG tablet, Take 1 tablet by mouth Daily., Disp: 90 tablet, Rfl: 1    Probiotic Product (Probiotic-10) chewable tablet, Chew 1 each Daily., Disp: 90 tablet, Rfl: 1           STEPHANY Martinez 10/23/2024 10:07 EDT  This note has been electronically signed

## 2024-10-24 LAB
ALBUMIN SERPL-MCNC: 4.5 G/DL (ref 3.8–4.8)
ALP SERPL-CCNC: 82 IU/L (ref 44–121)
ALT SERPL-CCNC: 20 IU/L (ref 0–32)
AST SERPL-CCNC: 17 IU/L (ref 0–40)
BASOPHILS # BLD AUTO: 0.1 X10E3/UL (ref 0–0.2)
BASOPHILS NFR BLD AUTO: 1 %
BILIRUB SERPL-MCNC: 0.3 MG/DL (ref 0–1.2)
BUN SERPL-MCNC: 22 MG/DL (ref 8–27)
BUN/CREAT SERPL: 30 (ref 12–28)
CALCIUM SERPL-MCNC: 9.9 MG/DL (ref 8.7–10.3)
CHLORIDE SERPL-SCNC: 106 MMOL/L (ref 96–106)
CHOLEST SERPL-MCNC: 134 MG/DL (ref 100–199)
CO2 SERPL-SCNC: 25 MMOL/L (ref 20–29)
CREAT SERPL-MCNC: 0.73 MG/DL (ref 0.57–1)
EGFRCR SERPLBLD CKD-EPI 2021: 85 ML/MIN/1.73
EOSINOPHIL # BLD AUTO: 0.3 X10E3/UL (ref 0–0.4)
EOSINOPHIL NFR BLD AUTO: 4 %
ERYTHROCYTE [DISTWIDTH] IN BLOOD BY AUTOMATED COUNT: 12.6 % (ref 11.7–15.4)
GLOBULIN SER CALC-MCNC: 2.2 G/DL (ref 1.5–4.5)
GLUCOSE SERPL-MCNC: 102 MG/DL (ref 70–99)
HBA1C MFR BLD: 6.2 % (ref 4.8–5.6)
HCT VFR BLD AUTO: 47.1 % (ref 34–46.6)
HDLC SERPL-MCNC: 37 MG/DL
HGB BLD-MCNC: 14.5 G/DL (ref 11.1–15.9)
IMM GRANULOCYTES # BLD AUTO: 0 X10E3/UL (ref 0–0.1)
IMM GRANULOCYTES NFR BLD AUTO: 1 %
LDLC SERPL CALC-MCNC: 64 MG/DL (ref 0–99)
LDLC/HDLC SERPL: 1.7 RATIO (ref 0–3.2)
LYMPHOCYTES # BLD AUTO: 1.9 X10E3/UL (ref 0.7–3.1)
LYMPHOCYTES NFR BLD AUTO: 24 %
MCH RBC QN AUTO: 27.7 PG (ref 26.6–33)
MCHC RBC AUTO-ENTMCNC: 30.8 G/DL (ref 31.5–35.7)
MCV RBC AUTO: 90 FL (ref 79–97)
MONOCYTES # BLD AUTO: 0.4 X10E3/UL (ref 0.1–0.9)
MONOCYTES NFR BLD AUTO: 5 %
NEUTROPHILS # BLD AUTO: 5.1 X10E3/UL (ref 1.4–7)
NEUTROPHILS NFR BLD AUTO: 65 %
PLATELET # BLD AUTO: 268 X10E3/UL (ref 150–450)
POTASSIUM SERPL-SCNC: 4.2 MMOL/L (ref 3.5–5.2)
PROT SERPL-MCNC: 6.7 G/DL (ref 6–8.5)
RBC # BLD AUTO: 5.23 X10E6/UL (ref 3.77–5.28)
SODIUM SERPL-SCNC: 143 MMOL/L (ref 134–144)
T3 SERPL-MCNC: 137 NG/DL (ref 71–180)
T4 FREE SERPL-MCNC: 1.23 NG/DL (ref 0.82–1.77)
TRIGL SERPL-MCNC: 201 MG/DL (ref 0–149)
TSH SERPL DL<=0.005 MIU/L-ACNC: 1.43 UIU/ML (ref 0.45–4.5)
VLDLC SERPL CALC-MCNC: 33 MG/DL (ref 5–40)
WBC # BLD AUTO: 7.7 X10E3/UL (ref 3.4–10.8)

## 2024-10-25 DIAGNOSIS — Z78.0 POSTMENOPAUSAL: ICD-10-CM

## 2024-10-28 ENCOUNTER — TELEPHONE (OUTPATIENT)
Dept: FAMILY MEDICINE CLINIC | Facility: CLINIC | Age: 79
End: 2024-10-28
Payer: MEDICARE

## 2024-10-28 NOTE — TELEPHONE ENCOUNTER
Attempting to contact pt, no answer, lmom    Hub relay    Moderate compartment degenerative changes to knee.  Keep appointment with Ortho

## 2024-10-28 NOTE — TELEPHONE ENCOUNTER
----- Message from Marley Santos sent at 10/24/2024  3:27 PM EDT -----  Moderate compartment degenerative changes to knee.  Keep appointment with Ortho

## 2025-01-24 ENCOUNTER — LAB (OUTPATIENT)
Dept: LAB | Facility: HOSPITAL | Age: 80
End: 2025-01-24
Payer: MEDICARE

## 2025-01-24 ENCOUNTER — HOSPITAL ENCOUNTER (OUTPATIENT)
Dept: CARDIOLOGY | Facility: HOSPITAL | Age: 80
Discharge: HOME | End: 2025-01-24
Payer: MEDICARE

## 2025-01-24 ENCOUNTER — TRANSCRIBE ORDERS (OUTPATIENT)
Dept: ADMINISTRATIVE | Facility: HOSPITAL | Age: 80
End: 2025-01-24
Payer: MEDICARE

## 2025-01-24 DIAGNOSIS — Z01.818 PRE-OP TESTING: ICD-10-CM

## 2025-01-24 DIAGNOSIS — Z01.818 PRE-OP TESTING: Primary | ICD-10-CM

## 2025-01-24 LAB
ALBUMIN SERPL-MCNC: 4.5 G/DL (ref 3.5–5.2)
ANION GAP SERPL CALCULATED.3IONS-SCNC: 13 MMOL/L (ref 5–15)
BASOPHILS # BLD AUTO: 0.06 10*3/MM3 (ref 0–0.2)
BASOPHILS NFR BLD AUTO: 0.6 % (ref 0–1.5)
BUN SERPL-MCNC: 20 MG/DL (ref 8–23)
BUN/CREAT SERPL: 26.7 (ref 7–25)
CALCIUM SPEC-SCNC: 10 MG/DL (ref 8.6–10.5)
CHLORIDE SERPL-SCNC: 102 MMOL/L (ref 98–107)
CO2 SERPL-SCNC: 27 MMOL/L (ref 22–29)
CREAT SERPL-MCNC: 0.75 MG/DL (ref 0.57–1)
DEPRECATED RDW RBC AUTO: 42.6 FL (ref 37–54)
EGFRCR SERPLBLD CKD-EPI 2021: 82.1 ML/MIN/1.73
EOSINOPHIL # BLD AUTO: 0.19 10*3/MM3 (ref 0–0.4)
EOSINOPHIL NFR BLD AUTO: 2.1 % (ref 0.3–6.2)
ERYTHROCYTE [DISTWIDTH] IN BLOOD BY AUTOMATED COUNT: 13.2 % (ref 12.3–15.4)
GLUCOSE SERPL-MCNC: 108 MG/DL (ref 65–99)
HBA1C MFR BLD: 6.11 % (ref 4.8–5.6)
HCT VFR BLD AUTO: 47 % (ref 34–46.6)
HGB BLD-MCNC: 14.4 G/DL (ref 12–15.9)
IMM GRANULOCYTES # BLD AUTO: 0.03 10*3/MM3 (ref 0–0.05)
IMM GRANULOCYTES NFR BLD AUTO: 0.3 % (ref 0–0.5)
LYMPHOCYTES # BLD AUTO: 2.14 10*3/MM3 (ref 0.7–3.1)
LYMPHOCYTES NFR BLD AUTO: 23.2 % (ref 19.6–45.3)
MCH RBC QN AUTO: 27 PG (ref 26.6–33)
MCHC RBC AUTO-ENTMCNC: 30.6 G/DL (ref 31.5–35.7)
MCV RBC AUTO: 88 FL (ref 79–97)
MONOCYTES # BLD AUTO: 0.45 10*3/MM3 (ref 0.1–0.9)
MONOCYTES NFR BLD AUTO: 4.9 % (ref 5–12)
MRSA DNA SPEC QL NAA+PROBE: NORMAL
NEUTROPHILS NFR BLD AUTO: 6.37 10*3/MM3 (ref 1.7–7)
NEUTROPHILS NFR BLD AUTO: 68.9 % (ref 42.7–76)
NRBC BLD AUTO-RTO: 0 /100 WBC (ref 0–0.2)
PLATELET # BLD AUTO: 291 10*3/MM3 (ref 140–450)
PMV BLD AUTO: 11.5 FL (ref 6–12)
POTASSIUM SERPL-SCNC: 3.6 MMOL/L (ref 3.5–5.2)
QT INTERVAL: 391 MS
QTC INTERVAL: 408 MS
RBC # BLD AUTO: 5.34 10*6/MM3 (ref 3.77–5.28)
SODIUM SERPL-SCNC: 142 MMOL/L (ref 136–145)
WBC NRBC COR # BLD AUTO: 9.24 10*3/MM3 (ref 3.4–10.8)

## 2025-01-24 PROCEDURE — 80048 BASIC METABOLIC PNL TOTAL CA: CPT

## 2025-01-24 PROCEDURE — 82040 ASSAY OF SERUM ALBUMIN: CPT

## 2025-01-24 PROCEDURE — 87641 MR-STAPH DNA AMP PROBE: CPT

## 2025-01-24 PROCEDURE — 36415 COLL VENOUS BLD VENIPUNCTURE: CPT

## 2025-01-24 PROCEDURE — 93005 ELECTROCARDIOGRAM TRACING: CPT | Performed by: ORTHOPAEDIC SURGERY

## 2025-01-24 PROCEDURE — 85025 COMPLETE CBC W/AUTO DIFF WBC: CPT

## 2025-01-24 PROCEDURE — 83036 HEMOGLOBIN GLYCOSYLATED A1C: CPT

## 2025-01-27 ENCOUNTER — TRANSCRIBE ORDERS (OUTPATIENT)
Dept: PHYSICAL THERAPY | Facility: CLINIC | Age: 80
End: 2025-01-27
Payer: MEDICARE

## 2025-01-27 DIAGNOSIS — M17.12 PRIMARY OSTEOARTHRITIS OF LEFT KNEE: Primary | ICD-10-CM

## 2025-02-02 LAB
QT INTERVAL: 391 MS
QTC INTERVAL: 408 MS

## 2025-02-13 ENCOUNTER — TREATMENT (OUTPATIENT)
Dept: PHYSICAL THERAPY | Facility: CLINIC | Age: 80
End: 2025-02-13
Payer: MEDICARE

## 2025-02-13 DIAGNOSIS — Z96.651 STATUS POST RIGHT KNEE REPLACEMENT: ICD-10-CM

## 2025-02-13 DIAGNOSIS — M25.661 KNEE STIFFNESS, RIGHT: ICD-10-CM

## 2025-02-13 DIAGNOSIS — Z96.652 HISTORY OF TOTAL LEFT KNEE REPLACEMENT: Primary | ICD-10-CM

## 2025-02-13 DIAGNOSIS — R29.898 RIGHT LEG WEAKNESS: ICD-10-CM

## 2025-02-13 DIAGNOSIS — M25.662 KNEE STIFFNESS, LEFT: ICD-10-CM

## 2025-02-13 DIAGNOSIS — R29.898 LEFT LEG WEAKNESS: ICD-10-CM

## 2025-02-13 NOTE — PROGRESS NOTES
Physical Therapy Initial Evaluation and Plan of Care                          [address] [place] [address]   [facility] [address] [place] [address]    Patient: Shelley Black   : 1945  Diagnosis/ICD-10 Code:  History of total left knee replacement [Z96.652]  Referring practitioner: Jarocho Brenner MD  Date of Initial Visit: 2025  Today's Date: 2025  Patient seen for 1 sessions           Subjective Questionnaire: Oxford Knee Score: 63% limitation      Subjective Evaluation    History of Present Illness  Mechanism of injury: Pt had left knee replacement on 25.  Pt now walking with a cane.     Pain  Quality: dull ache and throbbing  Aggravating factors: standing and ambulation    Patient Goals  Patient goals for therapy: decreased edema, decreased pain, increased motion and independence with ADLs/IADLs             Objective          Observations     Additional Knee Observation Details  Pt with dressing and compression stocking in place. No drainage.  Pt using a cane.     Active Range of Motion   Left Knee   Flexion: 86 degrees   Extensor la degrees     Passive Range of Motion   Left Knee   Flexion: 118 degrees   Extension: 7 degrees     Strength/Myotome Testing     Left Knee   Flexion: 3  Extension: 3-  Quadriceps contraction: fair      See Exercise, Manual, and Modality Logs for complete treatment.     Assessment & Plan       Assessment  Impairments: abnormal gait, abnormal muscle firing, abnormal or restricted ROM, activity intolerance, impaired balance, impaired physical strength, lacks appropriate home exercise program, pain with function, safety issue and weight-bearing intolerance   Functional limitations: walking, uncomfortable because of pain, moving in bed, standing and stooping   Assessment details: The patient presents to physical therapy with complaints of left knee pain s/p left TKA on 25. The patient presents with associated left knee weakness, stiffness, antalgic gait, and functional  deficits. The patient would benefit from skilled PT intervention to address the above mentioned functional limitations to allow the patient to return to her prior level of function.   Prognosis: good    Goals  Plan Goals: Short Term Goals: 6 weeks  1.  The patient will demonstrate 5 to 105 degrees of ROM for the left knee.  2.  The patient will demonstrate 4 /5 strength for left knee flexion and extension  3.   The patient will ambulate with a single tipped cane with appropriate gait mechanics.  4.   The patient will demonstrate 50 % limitation as scored on the Oxford Knee Score      Long Term Goals:12 weeks  1. The patient will demonstrate 0 to 120 degrees of ROM for the left knee in order to allow patient to ambulate smoothly.   2. The patient will demonstrate 5 /5 strength for left knee flexion and extension in order to allow patient improved joint stability  3.  The patient will ambulate without assistive device, independently, for community distances with normal biomechanics of the left lower extremity in order to improve mobility and allow patient to perform activities such as grocery shopping with greater ease.  4.   The patient will demonstrate 25 % limitation as scored on the Oxford Knee Score.        Plan  Therapy options: will be seen for skilled therapy services  Planned modality interventions: cryotherapy, electrical stimulation/Russian stimulation and TENS  Planned therapy interventions: balance/weight-bearing training, ADL retraining, soft tissue mobilization, strengthening, stretching, therapeutic activities, joint mobilization, home exercise program, gait training, functional ROM exercises, flexibility, body mechanics training, postural training, neuromuscular re-education and manual therapy  Frequency: 2x week  Duration in weeks: 12  Treatment plan discussed with: patient        Visit Diagnoses:    ICD-10-CM ICD-9-CM   1. History of total left knee replacement  Z96.652 V43.65   2. Left leg weakness   R29.898 729.89   3. Knee stiffness, left  M25.662 719.56       History # of Personal Factors and/or Comorbidities: LOW (0)  Examination of Body System(s): # of elements: LOW (1-2)  Clinical Presentation: STABLE   Clinical Decision Making: LOW       Timed:           Therapeutic Exercise:    15     mins  94902;     Neuromuscular Oscar:    0    mins  73278;  Manual Therapy:    10     mins  82083;    Therapeutic Activity:     0     mins  82606;     Gait Trainin     mins  51850;     Ultrasound:     0     mins  57061;    Ionto                               0    mins   28631  Self Care                       0     mins   33839        Un-Timed:  Electrical Stimulation:    0     mins  72973 ( );  Dry Needling     0     mins self-pay  Traction     0     mins 97907    Low Eval     25     Mins  65997  Mod Eval     0     Mins  80231  High Eval                       0     Mins  08638  Re-Eval                           0    mins  47709    Timed Treatment:   25   mins   Total Treatment:     50   mins    PT SIGNATURE: Dante Mena PT    Electronically signed 2025    [place] License: PT - [id number]  [place] License: PT - [id number]      Medicare Initial Certification  Certification Period: 2025 thru 2025  I certify that the therapy services are furnished while this patient is under my care.  The services outlined above are required by this patient, and will be reviewed every 90 days.     PHYSICIAN: Jarocho Brenner MD  NPI: [id number]      DATE:     Please sign and return via fax to [phone]. Thank you, [facility].

## 2025-02-21 ENCOUNTER — TREATMENT (OUTPATIENT)
Dept: PHYSICAL THERAPY | Facility: CLINIC | Age: 80
End: 2025-02-21
Payer: MEDICARE

## 2025-02-21 DIAGNOSIS — R29.898 LEFT LEG WEAKNESS: ICD-10-CM

## 2025-02-21 DIAGNOSIS — M25.661 KNEE STIFFNESS, RIGHT: ICD-10-CM

## 2025-02-21 DIAGNOSIS — Z96.651 STATUS POST RIGHT KNEE REPLACEMENT: ICD-10-CM

## 2025-02-21 DIAGNOSIS — Z96.652 HISTORY OF TOTAL LEFT KNEE REPLACEMENT: Primary | ICD-10-CM

## 2025-02-21 DIAGNOSIS — M25.662 KNEE STIFFNESS, LEFT: ICD-10-CM

## 2025-02-21 DIAGNOSIS — R29.898 RIGHT LEG WEAKNESS: ICD-10-CM

## 2025-02-21 NOTE — PROGRESS NOTES
Physical Therapy Daily Treatment Note      Patient: Shelley Black   : 1945  Diagnosis/ICD-10 Code:  History of total left knee replacement [Z96.652]   Problems Addressed this Visit    None  Visit Diagnoses       History of total left knee replacement    -  Primary    Left leg weakness        Knee stiffness, left        Status post right knee replacement        Knee stiffness, right        Right leg weakness              Diagnoses         Codes Comments    History of total left knee replacement    -  Primary ICD-10-CM: Z96.652  ICD-9-CM: V43.65     Left leg weakness     ICD-10-CM: R29.898  ICD-9-CM: 729.89     Knee stiffness, left     ICD-10-CM: M25.662  ICD-9-CM: 719.56     Status post right knee replacement     ICD-10-CM: Z96.651  ICD-9-CM: V43.65     Knee stiffness, right     ICD-10-CM: M25.661  ICD-9-CM: 719.56     Right leg weakness     ICD-10-CM: R29.898  ICD-9-CM: 729.89            Referring practitioner: Jarocho Brenner MD  Date of Initial Visit: Type: THERAPY  Noted: 2025  Today's Date: 2025    VISIT#: 2    Subjective Patient reports feeling good with progress at this time with minimal swelling and only mild soreness.       Objective     See Exercise, Manual, and Modality Logs for complete treatment.     Assessment/Plan Patient demonstrating good early passive motion and tolerated all progressed activity well with only reports of increased muscle fatigue. Patient will continue to benefit from improved L LE strengthening for improved stability/tolerance with daily functional activity.   Goals  Plan Goals: Short Term Goals: 6 weeks  1.          The patient will demonstrate 5 to 105 degrees of ROM for the left knee.  2.  The patient will demonstrate 4 /5 strength for left knee flexion and extension  3.   The patient will ambulate with a single tipped cane with appropriate gait mechanics.  4.   The patient will demonstrate 50 % limitation as scored on the Oxford Knee Score        Long  Term Goals:12 weeks  1.         The patient will demonstrate 0 to 120 degrees of ROM for the left knee in order to allow patient to ambulate smoothly.   2.The patient will demonstrate 5 /5 strength for left knee flexion and extension in order to allow patient improved joint stability  3.          The patient will ambulate without assistive device, independently, for community distances with normal biomechanics of the left lower extremity in order to improve mobility and allow patient to perform activities such as grocery shopping with greater ease.  4.   The patient will demonstrate 25 % limitation as scored on the Brown Knee Score.    Progress per Plan of Care and Progress strengthening /stabilization /functional activity         Timed:         Manual Therapy:    10     mins  38839;     Therapeutic Exercise:    20     mins  42508;     Neuromuscular Oscar:        mins  85489;    Therapeutic Activity:     10     mins  16954;     Gait Training:           mins  67446;     Ultrasound:          mins  69275;    Ionto                                  mins   96303  Self Care                    _____  mins   52850  Canalith Repos                   mins  47437    Un-Timed:  Electrical Stimulation:         mins  13886 ( );  Dry Needling          mins self-pay   Traction          mins 39925    Timed Treatment:   40   mins   Total Treatment:     40   mins    Randolph Meng PTA  [place] License [id number]  Physical Therapist Assistant

## 2025-02-25 ENCOUNTER — TREATMENT (OUTPATIENT)
Dept: PHYSICAL THERAPY | Facility: CLINIC | Age: 80
End: 2025-02-25
Payer: MEDICARE

## 2025-02-25 DIAGNOSIS — Z96.652 HISTORY OF TOTAL LEFT KNEE REPLACEMENT: ICD-10-CM

## 2025-02-25 DIAGNOSIS — Z96.651 STATUS POST RIGHT KNEE REPLACEMENT: ICD-10-CM

## 2025-02-25 DIAGNOSIS — M25.661 KNEE STIFFNESS, RIGHT: ICD-10-CM

## 2025-02-25 DIAGNOSIS — M25.662 KNEE STIFFNESS, LEFT: ICD-10-CM

## 2025-02-25 DIAGNOSIS — R29.898 LEFT LEG WEAKNESS: Primary | ICD-10-CM

## 2025-02-25 DIAGNOSIS — R29.898 RIGHT LEG WEAKNESS: ICD-10-CM

## 2025-02-25 NOTE — PROGRESS NOTES
Physical Therapy Daily Treatment Note                        [address] [place] [address] [address].  [address] [place] [address]    Patient: Shelley Black   : 1945  Diagnosis/ICD-10 Code:  Left leg weakness [R29.898]  Referring practitioner: Jarocho Brenner MD  Date of Initial Visit: Type: THERAPY  Noted: 2025  Today's Date: 2025  Patient seen for 3 sessions           Subjective   Pt doing well. No complaints.   Pt wanting to work on being able to squat soon.     Objective   See Exercise, Manual, and Modality Logs for complete treatment.     Assessment/Plan     Pt performed all therapeutic exercises with good technique. Pt continues to have limitations with strength and flexibility.  Still challenged with discomfort for heel prop though overall extension not lacking much.   Pt will benefit from continued PT to address these limitations.       Timed:  Therapeutic Exercise:    17     mins  61125;     Neuromuscular Oscar:   15    mins  67665;  Manual Therapy:    8     mins  78430;    Gait Trainin     mins  32103;     Ultrasound:                    0     mins  69593  Therapeutic Activity:     0     mins  53307;  Self Care                       0     mins   18355    Un-timed:  Electrical Stimulation:    0     mins  16126 ( );  Mechanical Traction      0     mins  79734  Dry Needling     0     mins self-pay  Re-Eval                          0     mins  83993         Timed Treatment:   40   mins   Total Treatment:     40   mins    Dante Mena PT  Physical Therapist    Electronically signed 2025    [place] License:  PT - [id number]  [place] License: PT - [id number]

## 2025-02-27 ENCOUNTER — TREATMENT (OUTPATIENT)
Dept: PHYSICAL THERAPY | Facility: CLINIC | Age: 80
End: 2025-02-27
Payer: MEDICARE

## 2025-02-27 DIAGNOSIS — R29.898 LEFT LEG WEAKNESS: ICD-10-CM

## 2025-02-27 DIAGNOSIS — M25.662 KNEE STIFFNESS, LEFT: Primary | ICD-10-CM

## 2025-02-27 DIAGNOSIS — Z96.652 HISTORY OF TOTAL LEFT KNEE REPLACEMENT: ICD-10-CM

## 2025-02-28 NOTE — PROGRESS NOTES
Physical Therapy Daily Treatment Note                                      [address] [place] [address]   [address] [place] [address]     Patient: Shelley Black   : 1945  Diagnosis/ICD-10 Code:  Knee stiffness, left [M25.662]  Referring practitioner: Jarocho Brenner MD  Date of Initial Visit: Type: THERAPY  Noted: 2025  Today's Date: 2025  Patient seen for Visit count could not be calculated. Make sure you are using a visit which is associated with an episode. sessions               Subjective      Pt feeling more sore today for no known reason.      Objective   See Exercise, Manual, and Modality Logs for complete treatment.      Assessment/Plan      Pt performed all therapeutic exercises with good technique. Pt continues to have limitations with strength and flexibility. Pt will benefit from continued PT to address these limitations.         Timed:  Therapeutic Exercise:    15     mins  10668;     Neuromuscular Oscar:   15    mins  98057;  Manual Therapy:            9     mins  67659;    Gait Trainin     mins  05528;     Ultrasound:                    0     mins  03501  Therapeutic Activity:      0     mins  60275;  Self Care                       0     mins   32314     Un-timed:  Electrical Stimulation:    0     mins  08579 ( );  Mechanical Traction      0     mins  98089  Dry Needling                  0     mins self-pay  Re-Eval                          0     mins  09842           Timed Treatment:   39   mins   Total Treatment:     39   mins     Dante Mena PT  Physical Therapist     Electronically signed 2025     [place] License:  PT - [id number]  [place] License: PT - [id number]

## 2025-03-03 ENCOUNTER — TREATMENT (OUTPATIENT)
Dept: PHYSICAL THERAPY | Facility: CLINIC | Age: 80
End: 2025-03-03
Payer: MEDICARE

## 2025-03-03 DIAGNOSIS — R29.898 LEFT LEG WEAKNESS: ICD-10-CM

## 2025-03-03 DIAGNOSIS — M25.661 KNEE STIFFNESS, RIGHT: ICD-10-CM

## 2025-03-03 DIAGNOSIS — M25.662 KNEE STIFFNESS, LEFT: Primary | ICD-10-CM

## 2025-03-03 DIAGNOSIS — Z96.652 HISTORY OF TOTAL LEFT KNEE REPLACEMENT: ICD-10-CM

## 2025-03-03 DIAGNOSIS — Z96.651 STATUS POST RIGHT KNEE REPLACEMENT: ICD-10-CM

## 2025-03-03 DIAGNOSIS — R29.898 RIGHT LEG WEAKNESS: ICD-10-CM

## 2025-03-03 PROCEDURE — 97112 NEUROMUSCULAR REEDUCATION: CPT

## 2025-03-03 PROCEDURE — 97110 THERAPEUTIC EXERCISES: CPT

## 2025-03-03 NOTE — PROGRESS NOTES
Physical Therapy Daily Treatment Note      Patient: Shelley Black   : 1945  Diagnosis/ICD-10 Code:  Knee stiffness, left [M25.662]   Problems Addressed this Visit    None  Visit Diagnoses       Knee stiffness, left    -  Primary    History of total left knee replacement        Left leg weakness        Status post right knee replacement        Knee stiffness, right        Right leg weakness              Diagnoses         Codes Comments    Knee stiffness, left    -  Primary ICD-10-CM: M25.662  ICD-9-CM: 719.56     History of total left knee replacement     ICD-10-CM: Z96.652  ICD-9-CM: V43.65     Left leg weakness     ICD-10-CM: R29.898  ICD-9-CM: 729.89     Status post right knee replacement     ICD-10-CM: Z96.651  ICD-9-CM: V43.65     Knee stiffness, right     ICD-10-CM: M25.661  ICD-9-CM: 719.56     Right leg weakness     ICD-10-CM: R29.898  ICD-9-CM: 729.89            Referring practitioner: Jarocho Brenner MD  Date of Initial Visit: Type: THERAPY  Noted: 2025  Today's Date: 3/3/2025    VISIT#: 5    Subjective Patient reports feeling some mild but manageable muscle soreness, overall doing ok.       Objective     See Exercise, Manual, and Modality Logs for complete treatment.     Assessment/Plan Patient demonstrating good passive motion with soft end feel. Patient will continue to benefit from improved L LE strengthening for improved stability/tolerance with daily functional activity.       Progress per Plan of Care and Progress strengthening /stabilization /functional activity         Timed:         Manual Therapy:    5     mins  30801;     Therapeutic Exercise:    15     mins  21739;     Neuromuscular Oscar:    10    mins  85368;    Therapeutic Activity:          mins  11663;     Gait Training:           mins  44694;     Ultrasound:          mins  91990;    Ionto                                   mins   27016  Self Care                    _____  mins   21049  [facility]                    mins  83850    Un-Timed:  Electrical Stimulation:         mins  25207 ( );  Dry Needling          mins self-pay   Traction          mins 49634    Timed Treatment:   30   mins   Total Treatment:     30   mins    Randolph Meng PTA  [place] License [id number]  Physical Therapist Assistant

## 2025-03-06 ENCOUNTER — TREATMENT (OUTPATIENT)
Dept: PHYSICAL THERAPY | Facility: CLINIC | Age: 80
End: 2025-03-06
Payer: MEDICARE

## 2025-03-06 DIAGNOSIS — M25.661 KNEE STIFFNESS, RIGHT: ICD-10-CM

## 2025-03-06 DIAGNOSIS — R29.898 RIGHT LEG WEAKNESS: ICD-10-CM

## 2025-03-06 DIAGNOSIS — M25.662 KNEE STIFFNESS, LEFT: Primary | ICD-10-CM

## 2025-03-06 DIAGNOSIS — R29.898 LEFT LEG WEAKNESS: ICD-10-CM

## 2025-03-06 DIAGNOSIS — Z96.652 HISTORY OF TOTAL LEFT KNEE REPLACEMENT: ICD-10-CM

## 2025-03-06 DIAGNOSIS — Z96.651 STATUS POST RIGHT KNEE REPLACEMENT: ICD-10-CM

## 2025-03-06 NOTE — PROGRESS NOTES
Physical Therapy Daily Treatment Note      Patient: Shelley Black   : 1945  Diagnosis/ICD-10 Code:  Knee stiffness, left [M25.662]   Problems Addressed this Visit    None  Visit Diagnoses       Knee stiffness, left    -  Primary    History of total left knee replacement        Left leg weakness        Status post right knee replacement        Knee stiffness, right        Right leg weakness              Diagnoses         Codes Comments    Knee stiffness, left    -  Primary ICD-10-CM: M25.662  ICD-9-CM: 719.56     History of total left knee replacement     ICD-10-CM: Z96.652  ICD-9-CM: V43.65     Left leg weakness     ICD-10-CM: R29.898  ICD-9-CM: 729.89     Status post right knee replacement     ICD-10-CM: Z96.651  ICD-9-CM: V43.65     Knee stiffness, right     ICD-10-CM: M25.661  ICD-9-CM: 719.56     Right leg weakness     ICD-10-CM: R29.898  ICD-9-CM: 729.89            Referring practitioner: Jarocho Brenner MD  Date of Initial Visit: Type: THERAPY  Noted: 2025  Today's Date: 3/6/2025    VISIT#: 6    Subjective Patient reports feeling some mild but manageable muscle soreness following progressions last visit.       Objective     See Exercise, Manual, and Modality Logs for complete treatment.     Assessment/Plan Patient continues to demonstrate good passive motion with soft end feel and will continue to benefit from improved L LE strengthening for improved stability/tolerance with daily functional activity.       Progress per Plan of Care and Progress strengthening /stabilization /functional activity         Timed:         Manual Therapy:    10     mins  59653;     Therapeutic Exercise:    20     mins  65334;     Neuromuscular Oscar:    10    mins  62383;    Therapeutic Activity:          mins  03214;     Gait Training:           mins  97478;     Ultrasound:          mins  07966;    Ionto                                   mins   53392  Self Care                    _____  mins   18780  Ellen  Repos                   mins  92649    Un-Timed:  Electrical Stimulation:         mins  90920 ( );  Dry Needling          mins self-pay   Traction          mins 36934    Timed Treatment:   40   mins   Total Treatment:     40   mins    Randolph Meng PTA  [place] License [id number]  Physical Therapist Assistant

## 2025-03-10 ENCOUNTER — TREATMENT (OUTPATIENT)
Dept: PHYSICAL THERAPY | Facility: CLINIC | Age: 80
End: 2025-03-10
Payer: MEDICARE

## 2025-03-10 DIAGNOSIS — R29.898 LEFT LEG WEAKNESS: ICD-10-CM

## 2025-03-10 DIAGNOSIS — M25.661 KNEE STIFFNESS, RIGHT: ICD-10-CM

## 2025-03-10 DIAGNOSIS — Z96.652 HISTORY OF TOTAL LEFT KNEE REPLACEMENT: ICD-10-CM

## 2025-03-10 DIAGNOSIS — Z96.651 STATUS POST RIGHT KNEE REPLACEMENT: ICD-10-CM

## 2025-03-10 DIAGNOSIS — M25.662 KNEE STIFFNESS, LEFT: Primary | ICD-10-CM

## 2025-03-10 DIAGNOSIS — R29.898 RIGHT LEG WEAKNESS: ICD-10-CM

## 2025-03-10 PROCEDURE — 97110 THERAPEUTIC EXERCISES: CPT | Performed by: PHYSICAL THERAPIST

## 2025-03-10 PROCEDURE — 97112 NEUROMUSCULAR REEDUCATION: CPT | Performed by: PHYSICAL THERAPIST

## 2025-03-10 NOTE — PROGRESS NOTES
Physical Therapy Daily Treatment Note      Patient: Shelley Black   : 1945  Diagnosis/ICD-10 Code:  Knee stiffness, left [M25.662]   Problems Addressed this Visit    None  Visit Diagnoses         Knee stiffness, left    -  Primary      History of total left knee replacement          Left leg weakness          Status post right knee replacement          Knee stiffness, right          Right leg weakness              Diagnoses         Codes Comments      Knee stiffness, left    -  Primary ICD-10-CM: M25.662  ICD-9-CM: 719.56       History of total left knee replacement     ICD-10-CM: Z96.652  ICD-9-CM: V43.65       Left leg weakness     ICD-10-CM: R29.898  ICD-9-CM: 729.89       Status post right knee replacement     ICD-10-CM: Z96.651  ICD-9-CM: V43.65       Knee stiffness, right     ICD-10-CM: M25.661  ICD-9-CM: 719.56       Right leg weakness     ICD-10-CM: R29.898  ICD-9-CM: 729.89            Referring practitioner: Jarocho Brenner MD  Date of Initial Visit: Type: THERAPY  Noted: 2025  Today's Date: 3/10/2025    VISIT#: 7    Subjective Patient reports feeling some increased swelling today in L leg.       Objective     See Exercise, Manual, and Modality Logs for complete treatment.     Assessment/Plan Patient with decreased symptoms following treatment and is demonstrating good passive motion with soft end feel. Patient will conitnue to benefit from improved L LE strengthening for improved stability/tolerance with daily functional activity.       Progress per Plan of Care and Progress strengthening /stabilization /functional activity         Timed:         Manual Therapy:    5     mins  34008;     Therapeutic Exercise:    20     mins  10309;     Neuromuscular Oscar:    10    mins  29228;    Therapeutic Activity:          mins  38780;     Gait Training:           mins  81129;     Ultrasound:          mins  51576;    Ionto                                   mins   61366  Self Care                     _____  mins   70006  Canalith Repos                   mins  02475    Un-Timed:  Electrical Stimulation:         mins  37744 ( );  Dry Needling          mins self-pay   Traction          mins 18145    Timed Treatment:   35   mins   Total Treatment:     35   mins    Randolph Meng PTA  [place] License [id number]  Physical Therapist Assistant

## 2025-03-13 ENCOUNTER — TREATMENT (OUTPATIENT)
Dept: PHYSICAL THERAPY | Facility: CLINIC | Age: 80
End: 2025-03-13
Payer: MEDICARE

## 2025-03-13 DIAGNOSIS — M25.662 KNEE STIFFNESS, LEFT: ICD-10-CM

## 2025-03-13 DIAGNOSIS — R29.898 LEFT LEG WEAKNESS: ICD-10-CM

## 2025-03-13 DIAGNOSIS — Z96.652 HISTORY OF TOTAL LEFT KNEE REPLACEMENT: Primary | ICD-10-CM

## 2025-03-13 NOTE — PROGRESS NOTES
Progress Note  [address] [place] [address] [address] [place] [address]      Patient: Shelley Black   : 1945  Diagnosis/ICD-10 Code:  History of total left knee replacement [Z96.652]  Referring practitioner: Jarocho Brenner MD  Date of Initial Visit: Type: THERAPY  Noted: 2025  Today's Date: 3/13/2025  Patient seen for 8 sessions      Subjective:     Clinical Progress: improved  Home Program Compliance: Yes  Treatment has included: therapeutic exercise, neuromuscular re-education, and manual therapy    Subjective     Objective   See Exercise, Manual, and Modality Logs for complete treatment.   Active Range of Motion   Left Knee   Flexion: 105 degrees   Extensor la degrees      Passive Range of Motion   Left Knee   Flexion: 120 degrees   Extension: 6 degrees      Strength/Myotome Testing      Left Knee   Flexion: 3+  Extension: 3+  Quadriceps contraction: fair   Assessment/Plan  Progress toward previous goals: Partially Met     Plan Goals: Short Term Goals: 6 weeks  1.          The patient will demonstrate 5 to 105 degrees of ROM for the left knee.- partially met  2.  The patient will demonstrate 4 /5 strength for left knee flexion and extension - partially met  3.   The patient will ambulate with a single tipped cane with appropriate gait mechanics. - met  4.   The patient will demonstrate 50 % limitation as scored on the Oxford Knee Score        Long Term Goals:12 weeks  1.         The patient will demonstrate 0 to 120 degrees of ROM for the left knee in order to allow patient to ambulate smoothly.  - not met  2.The patient will demonstrate 5 /5 strength for left knee flexion and extension in order to allow patient improved joint stability - not met  3.          The patient will ambulate without assistive device, independently, for community distances with normal biomechanics of the left lower extremity in order to improve mobility and allow patient to perform activities such as grocery shopping with  greater ease. - met  4.   The patient will demonstrate 25 % limitation as scored on the Oxford Knee Score. - not met      Recommendations: Continue as planned  Timeframe: 1 month  Prognosis to achieve goals: good    PT Signature: Dante Mena PT    Electronically signed 3/13/2025    [place] License[place] PT - [id number]  [place] License: PT - [id number]       Timed:  Therapeutic Exercise:    15     mins  82893;     Neuromuscular Oscar:    15    mins  69851;  Manual Therapy:    10     mins  03314;  Therapeutic Activity:     0     mins  06551;   Gait Trainin     mins  97172;     Ultrasound:                     0     mins  59406;  SelfCare                         0     mins  44548;      Un-timed:  Electrical Stimulation:    0     mins  23523 ( );  Mechanical Traction      0     mins  16241  Dry Needling     0     mins self-pay  Re-Eval                          0     mins  51793    Timed Treatment:   40   mins   Total Treatment:     40   mins

## 2025-03-17 ENCOUNTER — TREATMENT (OUTPATIENT)
Dept: PHYSICAL THERAPY | Facility: CLINIC | Age: 80
End: 2025-03-17
Payer: MEDICARE

## 2025-03-17 DIAGNOSIS — M25.662 KNEE STIFFNESS, LEFT: Primary | ICD-10-CM

## 2025-03-17 DIAGNOSIS — R29.898 LEFT LEG WEAKNESS: ICD-10-CM

## 2025-03-17 NOTE — PROGRESS NOTES
Physical Therapy Daily Treatment Note                        [address] [place] [address]   [facility] [address] [place] [address]    Patient: Shelley Black   : 1945  Diagnosis/ICD-10 Code:  Knee stiffness, left [M25.662]  Referring practitioner: Jarocho Brenner MD  Date of Initial Visit: Type: THERAPY  Noted: 2025  Today's Date: 3/17/2025  Patient seen for 9 sessions           Subjective   Pt reports increased pain along lateral aspect of knee today for no known reason. Pt later talked of a lot of walking on Saturday     Objective   See Exercise, Manual, and Modality Logs for complete treatment.   Too much pain for supine heel slides today, switched to sitting.   Knee appears to have some increased edema.     Assessment/Plan     Pt performed all therapeutic exercises with good technique.   Pt flared up from walking a lot over weekend. Applied ice to help with increased soreness and edema.   Pt continues to have limitations with strength and flexibility. Pt will benefit from continued PT to address these limitations.       Timed:  Therapeutic Exercise:    20     mins  72520;     Neuromuscular Oscar:   10    mins  08799;  Manual Therapy:    8     mins  80259;    Gait Trainin     mins  14401;     Ultrasound:                    0     mins  70071  Therapeutic Activity:     0     mins  22235;  Self Care                       0     mins   82561    Un-timed:  Electrical Stimulation:    0     mins  48672 ( );  Mechanical Traction      0     mins  36739  Dry Needling     0     mins self-pay  Re-Eval                          0     mins  38309         Timed Treatment:   38   mins   Total Treatment:     38   mins    Dante Mena, PT  Physical Therapist    Electronically signed 3/17/2025    [place] License:  PT - [id number]  [place] License: PT - [id number]

## 2025-03-20 ENCOUNTER — TREATMENT (OUTPATIENT)
Dept: PHYSICAL THERAPY | Facility: CLINIC | Age: 80
End: 2025-03-20
Payer: MEDICARE

## 2025-03-20 DIAGNOSIS — M25.661 KNEE STIFFNESS, RIGHT: ICD-10-CM

## 2025-03-20 DIAGNOSIS — R29.898 RIGHT LEG WEAKNESS: ICD-10-CM

## 2025-03-20 DIAGNOSIS — Z96.651 STATUS POST RIGHT KNEE REPLACEMENT: ICD-10-CM

## 2025-03-20 DIAGNOSIS — Z96.652 HISTORY OF TOTAL LEFT KNEE REPLACEMENT: ICD-10-CM

## 2025-03-20 DIAGNOSIS — M25.662 KNEE STIFFNESS, LEFT: Primary | ICD-10-CM

## 2025-03-20 DIAGNOSIS — R29.898 LEFT LEG WEAKNESS: ICD-10-CM

## 2025-03-20 NOTE — PROGRESS NOTES
Physical Therapy Daily Treatment Note      Patient: Shelley Black   : 1945  Diagnosis/ICD-10 Code:  Knee stiffness, left [M25.662]   Problems Addressed this Visit    None  Visit Diagnoses         Knee stiffness, left    -  Primary      Left leg weakness          History of total left knee replacement          Status post right knee replacement          Knee stiffness, right          Right leg weakness              Diagnoses         Codes Comments      Knee stiffness, left    -  Primary ICD-10-CM: M25.662  ICD-9-CM: 719.56       Left leg weakness     ICD-10-CM: R29.898  ICD-9-CM: 729.89       History of total left knee replacement     ICD-10-CM: Z96.652  ICD-9-CM: V43.65       Status post right knee replacement     ICD-10-CM: Z96.651  ICD-9-CM: V43.65       Knee stiffness, right     ICD-10-CM: M25.661  ICD-9-CM: 719.56       Right leg weakness     ICD-10-CM: R29.898  ICD-9-CM: 729.89            Referring practitioner: Jarocho Brenner MD  Date of Initial Visit: Type: THERAPY  Noted: 2025  Today's Date: 3/20/2025    VISIT#: 10    Subjective Patient reports feeling a little stiff and aching today in L knee due to rainy cold weather.       Objective     See Exercise, Manual, and Modality Logs for complete treatment.     Assessment/Plan Patient with decreased symptoms reported post treatment. Patient will continue to benefit from improved L LE strengthening for improved stability/tolerance with daily functional activity.       Progress per Plan of Care and Progress strengthening /stabilization /functional activity         Timed:         Manual Therapy:    10     mins  34768;     Therapeutic Exercise:    20     mins  85794;     Neuromuscular Oscar:    10    mins  00873;    Therapeutic Activity:          mins  39132;     Gait Training:           mins  23483;     Ultrasound:          mins  05132;    Ionto                                   mins   08173  Self Care                    _____  mins    45438  [facility]                   mins  68908    Un-Timed:  Electrical Stimulation:         mins  69380 ( );  Dry Needling          mins self-pay   Traction          mins 53520    Timed Treatment:  40    mins   Total Treatment:     40   mins    Randolph Meng PTA  [place] License [id number]  Physical Therapist Assistant

## 2025-03-24 ENCOUNTER — TREATMENT (OUTPATIENT)
Dept: PHYSICAL THERAPY | Facility: CLINIC | Age: 80
End: 2025-03-24
Payer: MEDICARE

## 2025-03-24 DIAGNOSIS — Z96.652 HISTORY OF TOTAL LEFT KNEE REPLACEMENT: ICD-10-CM

## 2025-03-24 DIAGNOSIS — M25.661 KNEE STIFFNESS, RIGHT: ICD-10-CM

## 2025-03-24 DIAGNOSIS — M25.662 KNEE STIFFNESS, LEFT: Primary | ICD-10-CM

## 2025-03-24 DIAGNOSIS — R29.898 RIGHT LEG WEAKNESS: ICD-10-CM

## 2025-03-24 DIAGNOSIS — Z96.651 STATUS POST RIGHT KNEE REPLACEMENT: ICD-10-CM

## 2025-03-24 DIAGNOSIS — R29.898 LEFT LEG WEAKNESS: ICD-10-CM

## 2025-03-24 PROCEDURE — 97112 NEUROMUSCULAR REEDUCATION: CPT

## 2025-03-24 PROCEDURE — 97110 THERAPEUTIC EXERCISES: CPT

## 2025-03-24 PROCEDURE — 97140 MANUAL THERAPY 1/> REGIONS: CPT

## 2025-03-24 NOTE — PROGRESS NOTES
Physical Therapy Daily Treatment Note      Patient: Shelley Black   : 1945  Diagnosis/ICD-10 Code:  Knee stiffness, left [M25.662]   Problems Addressed this Visit    None  Visit Diagnoses         Knee stiffness, left    -  Primary      Left leg weakness          History of total left knee replacement          Status post right knee replacement          Knee stiffness, right          Right leg weakness              Diagnoses         Codes Comments      Knee stiffness, left    -  Primary ICD-10-CM: M25.662  ICD-9-CM: 719.56       Left leg weakness     ICD-10-CM: R29.898  ICD-9-CM: 729.89       History of total left knee replacement     ICD-10-CM: Z96.652  ICD-9-CM: V43.65       Status post right knee replacement     ICD-10-CM: Z96.651  ICD-9-CM: V43.65       Knee stiffness, right     ICD-10-CM: M25.661  ICD-9-CM: 719.56       Right leg weakness     ICD-10-CM: R29.898  ICD-9-CM: 729.89            Referring practitioner: Jarocho Brenner MD  Date of Initial Visit: Type: THERAPY  Noted: 2025  Today's Date: 3/24/2025    VISIT#: 11    Subjective Patient reports feeling better the last couple days and with decreased constant aching in L knee.       Objective     See Exercise, Manual, and Modality Logs for complete treatment.     Assessment/Plan Patient tolerated progressed therapeutic exercise well with only reports of increased muscle fatigue. Patient will continue to benefit from improved L LE strengthening for improved stability/tolerance with daily functional activity.       Progress per Plan of Care and Progress strengthening /stabilization /functional activity         Timed:         Manual Therapy:    10     mins  98343;     Therapeutic Exercise:    20     mins  38907;     Neuromuscular Oscar:    10    mins  13916;    Therapeutic Activity:          mins  42834;     Gait Training:           mins  07394;     Ultrasound:          mins  91668;    Ionto                                   mins   10356  Self  Care                    _____  mins   03285  [facility]                   mins  72993    Un-Timed:  Electrical Stimulation:        mins  66506 ( );  Dry Needling          mins self-pay   Traction         mins 29109    Timed Treatment:   40   mins   Total Treatment:     40   mins    Randolph Meng PTA  [place] License [id number]  Physical Therapist Assistant

## 2025-03-27 ENCOUNTER — TREATMENT (OUTPATIENT)
Dept: PHYSICAL THERAPY | Facility: CLINIC | Age: 80
End: 2025-03-27
Payer: MEDICARE

## 2025-03-27 DIAGNOSIS — Z96.652 HISTORY OF TOTAL LEFT KNEE REPLACEMENT: ICD-10-CM

## 2025-03-27 DIAGNOSIS — M25.662 KNEE STIFFNESS, LEFT: Primary | ICD-10-CM

## 2025-03-27 DIAGNOSIS — R29.898 LEFT LEG WEAKNESS: ICD-10-CM

## 2025-04-03 ENCOUNTER — TREATMENT (OUTPATIENT)
Dept: PHYSICAL THERAPY | Facility: CLINIC | Age: 80
End: 2025-04-03
Payer: MEDICARE

## 2025-04-03 DIAGNOSIS — Z96.652 HISTORY OF TOTAL LEFT KNEE REPLACEMENT: ICD-10-CM

## 2025-04-03 DIAGNOSIS — M25.662 KNEE STIFFNESS, LEFT: Primary | ICD-10-CM

## 2025-04-03 DIAGNOSIS — M25.661 KNEE STIFFNESS, RIGHT: ICD-10-CM

## 2025-04-03 DIAGNOSIS — R29.898 LEFT LEG WEAKNESS: ICD-10-CM

## 2025-04-03 DIAGNOSIS — Z96.651 STATUS POST RIGHT KNEE REPLACEMENT: ICD-10-CM

## 2025-04-03 DIAGNOSIS — R29.898 RIGHT LEG WEAKNESS: ICD-10-CM

## 2025-04-03 NOTE — PROGRESS NOTES
Physical Therapy Daily Treatment Note      Patient: Shelley Black   : 1945  Diagnosis/ICD-10 Code:  Knee stiffness, left [M25.662]   Problems Addressed this Visit    None  Visit Diagnoses         Knee stiffness, left    -  Primary      Left leg weakness          History of total left knee replacement          Status post right knee replacement          Knee stiffness, right          Right leg weakness              Diagnoses         Codes Comments      Knee stiffness, left    -  Primary ICD-10-CM: M25.662  ICD-9-CM: 719.56       Left leg weakness     ICD-10-CM: R29.898  ICD-9-CM: 729.89       History of total left knee replacement     ICD-10-CM: Z96.652  ICD-9-CM: V43.65       Status post right knee replacement     ICD-10-CM: Z96.651  ICD-9-CM: V43.65       Knee stiffness, right     ICD-10-CM: M25.661  ICD-9-CM: 719.56       Right leg weakness     ICD-10-CM: R29.898  ICD-9-CM: 729.89            Referring practitioner: Jarocho Brenner MD  Date of Initial Visit: Type: THERAPY  Noted: 2025  Today's Date: 4/3/2025    VISIT#: 13    Subjective Patient reports feeling pretty good the past week, only difficulty she has noticed was from performing sit to stand from low sitting seat.       Objective     See Exercise, Manual, and Modality Logs for complete treatment.     Assessment/Plan Patient continues to demonstrate good passive motion and will continue to benefit from improved L LE strengthening for improved stability/tolerance with daily functional activity.        Progress per Plan of Care and Progress strengthening /stabilization /functional activity         Timed:         Manual Therapy:    10     mins  73710;     Therapeutic Exercise:    15     mins  97781;     Neuromuscular Oscar:    15    mins  75781;    Therapeutic Activity:          mins  72149;     Gait Training:           mins  59800;     Ultrasound:          mins  41573;    Ionto                                   mins   78137  Self Care                     _____  mins   28805  [facility]                   mins  21684    Un-Timed:  Electrical Stimulation:         mins  28734 ( );  Dry Needling          mins self-pay   Traction          mins 78922    Timed Treatment:   40   mins   Total Treatment:     40   mins    Randolph Meng PTA  [place] License [id number]  Physical Therapist Assistant

## 2025-04-10 ENCOUNTER — TREATMENT (OUTPATIENT)
Dept: PHYSICAL THERAPY | Facility: CLINIC | Age: 80
End: 2025-04-10
Payer: MEDICARE

## 2025-04-10 DIAGNOSIS — Z96.652 HISTORY OF TOTAL LEFT KNEE REPLACEMENT: ICD-10-CM

## 2025-04-10 DIAGNOSIS — M25.662 KNEE STIFFNESS, LEFT: ICD-10-CM

## 2025-04-10 DIAGNOSIS — R29.898 LEFT LEG WEAKNESS: Primary | ICD-10-CM

## 2025-04-10 NOTE — PROGRESS NOTES
Physical Therapy Daily Treatment Note                        [address] [place] [address]   [address] [place]. [address]    Patient: Shelley Black   : 1945  Diagnosis/ICD-10 Code:  Left leg weakness [R29.898]  Referring practitioner: Jarocho Brenner MD  Date of Initial Visit: Type: THERAPY  Noted: 2025  Today's Date: 4/10/2025  Patient seen for 14 sessions           Subjective   Pt doing well.      Objective   See Exercise, Manual, and Modality Logs for complete treatment.   AROM     Flexion 110,  ext lacks 4-5    Strength  flex/ext 4/5      Assessment/Plan     Pt performed all therapeutic exercises with good technique. Pt continues to have limitations with strength and flexibility. Pt will benefit from continued PT to address these limitations.       Timed:  Therapeutic Exercise:    15     mins  86937;     Neuromuscular Oscar:   15    mins  92730;  Manual Therapy:    10     mins  71430;    Gait Trainin     mins  08229;     Ultrasound:                    0     mins  20382  Therapeutic Activity:     0     mins  98233;  Self Care                       0     mins   86156    Un-timed:  Electrical Stimulation:    0     mins  35146 ( );  Mechanical Traction      0     mins  51028  Dry Needling     0     mins self-pay  Re-Eval                          0     mins  71682         Timed Treatment:   40   mins   Total Treatment:     40   mins    Dante Mena PT  Physical Therapist    Electronically signed 2025    [place] License:  PT - [id number]  [place] License: PT - [id number]

## 2025-04-17 ENCOUNTER — OFFICE VISIT (OUTPATIENT)
Dept: FAMILY MEDICINE CLINIC | Facility: CLINIC | Age: 80
End: 2025-04-17
Payer: MEDICARE

## 2025-04-17 VITALS
TEMPERATURE: 96.8 F | WEIGHT: 168 LBS | OXYGEN SATURATION: 97 % | DIASTOLIC BLOOD PRESSURE: 80 MMHG | BODY MASS INDEX: 27.99 KG/M2 | HEIGHT: 65 IN | SYSTOLIC BLOOD PRESSURE: 142 MMHG | HEART RATE: 56 BPM

## 2025-04-17 DIAGNOSIS — E78.2 MIXED HYPERLIPIDEMIA: Primary | ICD-10-CM

## 2025-04-17 DIAGNOSIS — R53.83 OTHER FATIGUE: ICD-10-CM

## 2025-04-17 DIAGNOSIS — Z00.00 PREVENTATIVE HEALTH CARE: ICD-10-CM

## 2025-04-17 DIAGNOSIS — R73.03 PREDIABETES: ICD-10-CM

## 2025-04-17 PROBLEM — Z96.652 HISTORY OF LEFT KNEE REPLACEMENT: Status: ACTIVE | Noted: 2025-04-17

## 2025-04-17 RX ORDER — ATORVASTATIN CALCIUM 40 MG/1
40 TABLET, FILM COATED ORAL
Qty: 90 TABLET | Refills: 1 | Status: SHIPPED | OUTPATIENT
Start: 2025-04-17

## 2025-04-17 RX ORDER — DULOXETIN HYDROCHLORIDE 60 MG/1
60 CAPSULE, DELAYED RELEASE ORAL DAILY
Qty: 90 CAPSULE | Refills: 1 | Status: SHIPPED | OUTPATIENT
Start: 2025-04-17

## 2025-04-17 RX ORDER — FENOFIBRATE 145 MG/1
145 TABLET, COATED ORAL DAILY
Qty: 90 TABLET | Refills: 1 | Status: SHIPPED | OUTPATIENT
Start: 2025-04-17

## 2025-04-17 RX ORDER — AMLODIPINE BESYLATE 2.5 MG/1
2.5 TABLET ORAL
Qty: 90 TABLET | Refills: 1 | Status: SHIPPED | OUTPATIENT
Start: 2025-04-17

## 2025-04-17 RX ORDER — BISOPROLOL FUMARATE AND HYDROCHLOROTHIAZIDE 10; 6.25 MG/1; MG/1
1 TABLET ORAL DAILY
Qty: 90 TABLET | Refills: 1 | Status: SHIPPED | OUTPATIENT
Start: 2025-04-17

## 2025-04-17 NOTE — PROGRESS NOTES
Subjective   The ABCs of the Annual Wellness Visit  Medicare Wellness Visit      Shelley Black is a 80 y.o. patient who presents for a Medicare Wellness Visit.    The following portions of the patient's history were reviewed and   updated as appropriate: allergies, current medications, past medical history, past social history, past surgical history, and problem list.    Compared to one year ago, the patient's physical   health is the same.  Compared to one year ago, the patient's mental   health is the same.    Recent Hospitalizations:  She was not admitted to the hospital during the last year.     Current Medical Providers:  Patient Care Team:  Marley Santos APRN as PCP - General    Outpatient Medications Prior to Visit   Medication Sig Dispense Refill    diclofenac (VOLTAREN) 75 MG EC tablet TAKE 1 TABLET BY MOUTH TWICE DAILY AS NEEDED FOR JOINT PAIN 60 tablet 11    Probiotic Product (Probiotic-10) chewable tablet Chew 1 each Daily. 90 tablet 1    amLODIPine (NORVASC) 2.5 MG tablet TAKE 1 TABLET BY MOUTH EVERY  NIGHT AT BEDTIME 90 tablet 3    atorvastatin (LIPITOR) 40 MG tablet TAKE 1 TABLET BY MOUTH EVERY  NIGHT AT BEDTIME 90 tablet 3    bisoprolol-hydrochlorothiazide (ZIAC) 10-6.25 MG per tablet TAKE 1 TABLET BY MOUTH DAILY 90 tablet 3    DULoxetine (CYMBALTA) 60 MG capsule TAKE 1 CAPSULE BY MOUTH DAILY 90 capsule 3    fenofibrate (TRICOR) 145 MG tablet Take 1 tablet by mouth Daily. 90 tablet 1     No facility-administered medications prior to visit.     No opioid medication identified on active medication list. I have reviewed chart for other potential  high risk medication/s and harmful drug interactions in the elderly.      Aspirin is not on active medication list.  Aspirin use is not indicated based on review of current medical condition/s. Risk of harm outweighs potential benefits.  .    Patient Active Problem List   Diagnosis    Hypertension    Psoriasis    Encounter for screening for lipoid  "disorders    Preoperative evaluation to rule out surgical contraindication    Depression    Bunion, left    Arthritis    Allergic rhinitis due to pollen    Mixed hyperlipidemia    High triglycerides    Skin lesion of chest wall    Chronic pain of left knee    Primary osteoarthritis of left knee    Left wrist fracture, sequela    BMI 27.0-27.9,adult     Advance Care Planning Advance Directive is not on file.  ACP discussion was declined by the patient. Patient does not have an advance directive, declines further assistance.            Objective   Vitals:    25 1053   BP: 142/80   BP Location: Right arm   Patient Position: Sitting   Cuff Size: Adult   Pulse: 56   Temp: 96.8 °F (36 °C)   TempSrc: Infrared   SpO2: 97%   Weight: 76.2 kg (168 lb)   Height: 165.1 cm (65\")   PainSc: 6        Estimated body mass index is 27.96 kg/m² as calculated from the following:    Height as of this encounter: 165.1 cm (65\").    Weight as of this encounter: 76.2 kg (168 lb).                Does the patient have evidence of cognitive impairment? No  Lab Results   Component Value Date    HGBA1C 6.11 (H) 2025                                                                                                Health  Risk Assessment    Smoking Status:  Social History     Tobacco Use   Smoking Status Former    Current packs/day: 0.00    Average packs/day: 1 pack/day for 28.0 years (28.0 ttl pk-yrs)    Types: Cigarettes    Start date:     Quit date:     Years since quittin.3    Passive exposure: Past   Smokeless Tobacco Never     Alcohol Consumption:  Social History     Substance and Sexual Activity   Alcohol Use No       Fall Risk Screen  STEADI Fall Risk Assessment was completed, and patient is at LOW risk for falls.Assessment completed on:2025    Depression Screening   Little interest or pleasure in doing things? Not at all   Feeling down, depressed, or hopeless? Not at all   PHQ-2 Total Score 0      Health Habits " and Functional and Cognitive Screenin/17/2025    10:59 AM   Functional & Cognitive Status   Do you have difficulty preparing food and eating? No   Do you have difficulty bathing yourself, getting dressed or grooming yourself? No   Do you have difficulty using the toilet? No   Do you have difficulty moving around from place to place? No   Do you have trouble with steps or getting out of a bed or a chair? Yes   Current Diet Well Balanced Diet   Dental Exam Up to date   Eye Exam Up to date   Exercise (times per week) 4 times per week   Current Exercises Include Yard Work;House Cleaning   Do you need help using the phone?  No   Are you deaf or do you have serious difficulty hearing?  No   Do you need help to go to places out of walking distance? No   Do you need help shopping? No   Do you need help preparing meals?  No   Do you need help with housework?  No   Do you need help with laundry? No   Do you need help taking your medications? No   Do you need help managing money? No   Do you ever drive or ride in a car without wearing a seat belt? No   Have you felt unusual stress, anger or loneliness in the last month? Yes   Who do you live with? Spouse   If you need help, do you have trouble finding someone available to you? No   Have you been bothered in the last four weeks by sexual problems? No   Do you have difficulty concentrating, remembering or making decisions? No           Age-appropriate Screening Schedule:  Refer to the list below for future screening recommendations based on patient's age, sex and/or medical conditions. Orders for these recommended tests are listed in the plan section. The patient has been provided with a written plan.    Health Maintenance List  Health Maintenance   Topic Date Due    TDAP/TD VACCINES (1 - Tdap) Never done    ZOSTER VACCINE (1 of 2) Never done    HEPATITIS C SCREENING  Never done    RSV Vaccine - Adults (1 - 1-dose 75+ series) Never done    COVID-19 Vaccine (  season) 09/01/2024    ANNUAL WELLNESS VISIT  04/15/2025    INFLUENZA VACCINE  07/01/2025    LIPID PANEL  10/23/2025    DXA SCAN  10/23/2026    Pneumococcal Vaccine 50+  Completed    MAMMOGRAM  Discontinued    COLORECTAL CANCER SCREENING  Discontinued                                                                                                                                                CMS Preventative Services Quick Reference  Risk Factors Identified During Encounter  None Identified    The above risks/problems have been discussed with the patient.  Pertinent information has been shared with the patient in the After Visit Summary.  An After Visit Summary and PPPS were made available to the patient.    Follow Up:   Next Medicare Wellness visit to be scheduled in 1 year.         Additional E&M Note during same encounter follows:  Patient has additional, significant, and separately identifiable condition(s)/problem(s) that require work above and beyond the Medicare Wellness Visit     Chief Complaint  Medicare Wellness-subsequent    Subjective   80-year-old white female with history of hypertension, chronic arthritis pain, psoriasis,  allergic rhinitis and recent left knee replacement who comes in today for Medicare wellness visit    Blood pressure 142/80 dyspnea, tachycardia or dizziness heart rate 56 with large systolic murmur she denies any chest pain    Patient had left knee replacement in February states it still kind of sore but definitely feels better than before.  She just completed physical therapy    Patient is a prediabetic blood sugar 108 A1c 6.1 triglycerides 201 and she admits to eating a lot of carbs    She has no other new complaints.  Her weight is 168 with a BMI of 27.9.  She has had 5 COVID vaccines she is up-to-date on eye exams.  Her mammogram is due in September 2025 her DEXA scan is due in October 2026.  Her next Cologuard is due in January 2028.  Patient does not have a living will and  "already has information regarding          Fasting blood work  Keep appointment with Ortho  Watch sweets and carbs  Follow-up 6 months          Review of Systems   Constitutional: Negative.    HENT: Negative.     Respiratory: Negative.     Cardiovascular: Negative.    Gastrointestinal: Negative.    Genitourinary: Negative.    Musculoskeletal:         Left knee pain   Skin:         Left knee incision   Psychiatric/Behavioral: Negative.                Objective   Vital Signs:  /80 (BP Location: Right arm, Patient Position: Sitting, Cuff Size: Adult)   Pulse 56   Temp 96.8 °F (36 °C) (Infrared)   Ht 165.1 cm (65\")   Wt 76.2 kg (168 lb)   SpO2 97%   BMI 27.96 kg/m²   Physical Exam  Constitutional:       Appearance: Normal appearance.   HENT:      Head: Normocephalic.   Cardiovascular:      Rate and Rhythm: Normal rate and regular rhythm.      Pulses: Normal pulses.      Heart sounds: Normal heart sounds.   Pulmonary:      Effort: Pulmonary effort is normal.      Breath sounds: Normal breath sounds.   Abdominal:      General: Bowel sounds are normal.   Musculoskeletal:         General: Normal range of motion.      Comments: Still having some slight swelling and pain in left knee   Skin:     General: Skin is warm.      Comments: Left knee incision well-healed   Neurological:      General: No focal deficit present.      Mental Status: She is alert and oriented to person, place, and time.   Psychiatric:         Mood and Affect: Mood normal.         Behavior: Behavior normal.                    Assessment and Plan      Mixed hyperlipidemia       Orders:    Lipid Panel With LDL / HDL Ratio    Preventative health care    Orders:    CBC & Differential    Other fatigue    Orders:    TSH+Free T4    T3    Prediabetes    Orders:    Comprehensive Metabolic Panel    Hemoglobin A1c    BMI 27.0-27.9,adult                 Follow Up   No follow-ups on file.  Patient was given instructions and counseling regarding her " condition or for health maintenance advice. Please see specific information pulled into the AVS if appropriate.

## 2025-04-18 LAB
ALBUMIN SERPL-MCNC: 4.5 G/DL (ref 3.8–4.8)
ALP SERPL-CCNC: 81 IU/L (ref 44–121)
ALT SERPL-CCNC: 14 IU/L (ref 0–32)
AST SERPL-CCNC: 14 IU/L (ref 0–40)
BASOPHILS # BLD AUTO: 0.1 X10E3/UL (ref 0–0.2)
BASOPHILS NFR BLD AUTO: 1 %
BILIRUB SERPL-MCNC: 0.5 MG/DL (ref 0–1.2)
BUN SERPL-MCNC: 19 MG/DL (ref 8–27)
BUN/CREAT SERPL: 23 (ref 12–28)
CALCIUM SERPL-MCNC: 10.2 MG/DL (ref 8.7–10.3)
CHLORIDE SERPL-SCNC: 105 MMOL/L (ref 96–106)
CHOLEST SERPL-MCNC: 134 MG/DL (ref 100–199)
CO2 SERPL-SCNC: 27 MMOL/L (ref 20–29)
CREAT SERPL-MCNC: 0.81 MG/DL (ref 0.57–1)
EGFRCR SERPLBLD CKD-EPI 2021: 74 ML/MIN/1.73
EOSINOPHIL # BLD AUTO: 0.4 X10E3/UL (ref 0–0.4)
EOSINOPHIL NFR BLD AUTO: 4 %
ERYTHROCYTE [DISTWIDTH] IN BLOOD BY AUTOMATED COUNT: 13 % (ref 11.7–15.4)
GLOBULIN SER CALC-MCNC: 2.3 G/DL (ref 1.5–4.5)
GLUCOSE SERPL-MCNC: 94 MG/DL (ref 70–99)
HBA1C MFR BLD: 6.1 % (ref 4.8–5.6)
HCT VFR BLD AUTO: 44.7 % (ref 34–46.6)
HDLC SERPL-MCNC: 39 MG/DL
HGB BLD-MCNC: 14.3 G/DL (ref 11.1–15.9)
IMM GRANULOCYTES # BLD AUTO: 0 X10E3/UL (ref 0–0.1)
IMM GRANULOCYTES NFR BLD AUTO: 0 %
LDLC SERPL CALC-MCNC: 66 MG/DL (ref 0–99)
LDLC/HDLC SERPL: 1.7 RATIO (ref 0–3.2)
LYMPHOCYTES # BLD AUTO: 2 X10E3/UL (ref 0.7–3.1)
LYMPHOCYTES NFR BLD AUTO: 23 %
MCH RBC QN AUTO: 28.4 PG (ref 26.6–33)
MCHC RBC AUTO-ENTMCNC: 32 G/DL (ref 31.5–35.7)
MCV RBC AUTO: 89 FL (ref 79–97)
MONOCYTES # BLD AUTO: 0.5 X10E3/UL (ref 0.1–0.9)
MONOCYTES NFR BLD AUTO: 5 %
NEUTROPHILS # BLD AUTO: 5.9 X10E3/UL (ref 1.4–7)
NEUTROPHILS NFR BLD AUTO: 67 %
PLATELET # BLD AUTO: 293 X10E3/UL (ref 150–450)
POTASSIUM SERPL-SCNC: 4.4 MMOL/L (ref 3.5–5.2)
PROT SERPL-MCNC: 6.8 G/DL (ref 6–8.5)
RBC # BLD AUTO: 5.03 X10E6/UL (ref 3.77–5.28)
SODIUM SERPL-SCNC: 146 MMOL/L (ref 134–144)
T3 SERPL-MCNC: 133 NG/DL (ref 71–180)
T4 FREE SERPL-MCNC: 1.36 NG/DL (ref 0.82–1.77)
TRIGL SERPL-MCNC: 171 MG/DL (ref 0–149)
TSH SERPL DL<=0.005 MIU/L-ACNC: 0.57 UIU/ML (ref 0.45–4.5)
VLDLC SERPL CALC-MCNC: 29 MG/DL (ref 5–40)
WBC # BLD AUTO: 8.9 X10E3/UL (ref 3.4–10.8)